# Patient Record
Sex: FEMALE | Race: WHITE | NOT HISPANIC OR LATINO
[De-identification: names, ages, dates, MRNs, and addresses within clinical notes are randomized per-mention and may not be internally consistent; named-entity substitution may affect disease eponyms.]

---

## 2017-01-08 ENCOUNTER — APPOINTMENT (OUTPATIENT)
Dept: HUMAN REPRODUCTION | Facility: CLINIC | Age: 32
End: 2017-01-08

## 2017-01-10 ENCOUNTER — APPOINTMENT (OUTPATIENT)
Dept: HUMAN REPRODUCTION | Facility: CLINIC | Age: 32
End: 2017-01-10

## 2017-01-11 ENCOUNTER — APPOINTMENT (OUTPATIENT)
Dept: HUMAN REPRODUCTION | Facility: CLINIC | Age: 32
End: 2017-01-11

## 2017-01-12 ENCOUNTER — APPOINTMENT (OUTPATIENT)
Dept: HUMAN REPRODUCTION | Facility: CLINIC | Age: 32
End: 2017-01-12

## 2017-01-27 ENCOUNTER — APPOINTMENT (OUTPATIENT)
Dept: HUMAN REPRODUCTION | Facility: CLINIC | Age: 32
End: 2017-01-27

## 2017-02-03 ENCOUNTER — APPOINTMENT (OUTPATIENT)
Dept: HUMAN REPRODUCTION | Facility: CLINIC | Age: 32
End: 2017-02-03

## 2017-02-06 ENCOUNTER — APPOINTMENT (OUTPATIENT)
Dept: HUMAN REPRODUCTION | Facility: CLINIC | Age: 32
End: 2017-02-06

## 2017-02-07 ENCOUNTER — APPOINTMENT (OUTPATIENT)
Dept: HUMAN REPRODUCTION | Facility: CLINIC | Age: 32
End: 2017-02-07

## 2017-02-08 ENCOUNTER — APPOINTMENT (OUTPATIENT)
Dept: HUMAN REPRODUCTION | Facility: CLINIC | Age: 32
End: 2017-02-08

## 2017-02-22 ENCOUNTER — APPOINTMENT (OUTPATIENT)
Dept: HUMAN REPRODUCTION | Facility: CLINIC | Age: 32
End: 2017-02-22

## 2017-02-23 ENCOUNTER — OTHER (OUTPATIENT)
Age: 32
End: 2017-02-23

## 2017-03-01 ENCOUNTER — RESULT REVIEW (OUTPATIENT)
Age: 32
End: 2017-03-01

## 2017-03-02 ENCOUNTER — APPOINTMENT (OUTPATIENT)
Dept: HUMAN REPRODUCTION | Facility: CLINIC | Age: 32
End: 2017-03-02

## 2017-03-07 ENCOUNTER — OTHER (OUTPATIENT)
Age: 32
End: 2017-03-07

## 2017-03-16 ENCOUNTER — RX RENEWAL (OUTPATIENT)
Age: 32
End: 2017-03-16

## 2017-03-16 ENCOUNTER — OTHER (OUTPATIENT)
Age: 32
End: 2017-03-16

## 2017-03-16 ENCOUNTER — APPOINTMENT (OUTPATIENT)
Dept: HUMAN REPRODUCTION | Facility: CLINIC | Age: 32
End: 2017-03-16

## 2017-03-20 ENCOUNTER — APPOINTMENT (OUTPATIENT)
Dept: HUMAN REPRODUCTION | Facility: CLINIC | Age: 32
End: 2017-03-20

## 2017-03-22 ENCOUNTER — APPOINTMENT (OUTPATIENT)
Dept: HUMAN REPRODUCTION | Facility: CLINIC | Age: 32
End: 2017-03-22

## 2017-03-23 ENCOUNTER — APPOINTMENT (OUTPATIENT)
Dept: HUMAN REPRODUCTION | Facility: CLINIC | Age: 32
End: 2017-03-23

## 2017-03-24 ENCOUNTER — APPOINTMENT (OUTPATIENT)
Dept: HUMAN REPRODUCTION | Facility: CLINIC | Age: 32
End: 2017-03-24

## 2017-03-24 ENCOUNTER — OTHER (OUTPATIENT)
Age: 32
End: 2017-03-24

## 2017-03-26 ENCOUNTER — APPOINTMENT (OUTPATIENT)
Dept: HUMAN REPRODUCTION | Facility: CLINIC | Age: 32
End: 2017-03-26

## 2017-03-28 ENCOUNTER — APPOINTMENT (OUTPATIENT)
Dept: HUMAN REPRODUCTION | Facility: CLINIC | Age: 32
End: 2017-03-28

## 2017-03-29 ENCOUNTER — APPOINTMENT (OUTPATIENT)
Dept: HUMAN REPRODUCTION | Facility: CLINIC | Age: 32
End: 2017-03-29

## 2017-03-30 ENCOUNTER — APPOINTMENT (OUTPATIENT)
Dept: HUMAN REPRODUCTION | Facility: CLINIC | Age: 32
End: 2017-03-30

## 2017-04-11 ENCOUNTER — OTHER (OUTPATIENT)
Age: 32
End: 2017-04-11

## 2017-04-12 ENCOUNTER — APPOINTMENT (OUTPATIENT)
Dept: HUMAN REPRODUCTION | Facility: CLINIC | Age: 32
End: 2017-04-12

## 2017-04-13 ENCOUNTER — RX RENEWAL (OUTPATIENT)
Age: 32
End: 2017-04-13

## 2017-04-13 ENCOUNTER — OTHER (OUTPATIENT)
Age: 32
End: 2017-04-13

## 2017-04-14 ENCOUNTER — RX RENEWAL (OUTPATIENT)
Age: 32
End: 2017-04-14

## 2017-04-20 ENCOUNTER — APPOINTMENT (OUTPATIENT)
Dept: HUMAN REPRODUCTION | Facility: CLINIC | Age: 32
End: 2017-04-20

## 2017-04-21 ENCOUNTER — TRANSCRIPTION ENCOUNTER (OUTPATIENT)
Age: 32
End: 2017-04-21

## 2017-04-27 ENCOUNTER — APPOINTMENT (OUTPATIENT)
Dept: HUMAN REPRODUCTION | Facility: CLINIC | Age: 32
End: 2017-04-27

## 2017-05-04 ENCOUNTER — APPOINTMENT (OUTPATIENT)
Dept: HUMAN REPRODUCTION | Facility: CLINIC | Age: 32
End: 2017-05-04

## 2017-05-08 ENCOUNTER — RX RENEWAL (OUTPATIENT)
Age: 32
End: 2017-05-08

## 2017-05-24 ENCOUNTER — APPOINTMENT (OUTPATIENT)
Dept: HUMAN REPRODUCTION | Facility: CLINIC | Age: 32
End: 2017-05-24

## 2017-05-24 ENCOUNTER — OTHER (OUTPATIENT)
Age: 32
End: 2017-05-24

## 2017-05-25 ENCOUNTER — APPOINTMENT (OUTPATIENT)
Dept: HUMAN REPRODUCTION | Facility: CLINIC | Age: 32
End: 2017-05-25

## 2017-05-30 ENCOUNTER — APPOINTMENT (OUTPATIENT)
Dept: HUMAN REPRODUCTION | Facility: CLINIC | Age: 32
End: 2017-05-30

## 2017-06-01 ENCOUNTER — RX RENEWAL (OUTPATIENT)
Age: 32
End: 2017-06-01

## 2017-06-12 ENCOUNTER — APPOINTMENT (OUTPATIENT)
Dept: HUMAN REPRODUCTION | Facility: CLINIC | Age: 32
End: 2017-06-12

## 2017-06-22 ENCOUNTER — APPOINTMENT (OUTPATIENT)
Dept: HUMAN REPRODUCTION | Facility: CLINIC | Age: 32
End: 2017-06-22

## 2018-01-18 ENCOUNTER — INPATIENT (INPATIENT)
Facility: HOSPITAL | Age: 33
LOS: 1 days | Discharge: HOME | End: 2018-01-20
Attending: STUDENT IN AN ORGANIZED HEALTH CARE EDUCATION/TRAINING PROGRAM | Admitting: STUDENT IN AN ORGANIZED HEALTH CARE EDUCATION/TRAINING PROGRAM
Payer: COMMERCIAL

## 2018-01-18 VITALS — WEIGHT: 156.53 LBS | HEIGHT: 66 IN

## 2018-01-18 DIAGNOSIS — O26.899 OTHER SPECIFIED PREGNANCY RELATED CONDITIONS, UNSPECIFIED TRIMESTER: ICD-10-CM

## 2018-01-18 DIAGNOSIS — Z3A.00 WEEKS OF GESTATION OF PREGNANCY NOT SPECIFIED: ICD-10-CM

## 2018-01-18 DIAGNOSIS — Z34.80 ENCOUNTER FOR SUPERVISION OF OTHER NORMAL PREGNANCY, UNSPECIFIED TRIMESTER: ICD-10-CM

## 2018-01-18 LAB
BASOPHILS # BLD AUTO: 0.1 K/UL — SIGNIFICANT CHANGE UP (ref 0–0.2)
BASOPHILS NFR BLD AUTO: 0.5 % — SIGNIFICANT CHANGE UP (ref 0–2)
BLD GP AB SCN SERPL QL: NEGATIVE — SIGNIFICANT CHANGE UP
EOSINOPHIL # BLD AUTO: 0 K/UL — SIGNIFICANT CHANGE UP (ref 0–0.5)
EOSINOPHIL NFR BLD AUTO: 0.3 % — SIGNIFICANT CHANGE UP (ref 0–6)
HCT VFR BLD CALC: 39.1 % — SIGNIFICANT CHANGE UP (ref 34.5–45)
HGB BLD-MCNC: 14.2 G/DL — SIGNIFICANT CHANGE UP (ref 11.5–15.5)
LYMPHOCYTES # BLD AUTO: 1.8 K/UL — SIGNIFICANT CHANGE UP (ref 1–3.3)
LYMPHOCYTES # BLD AUTO: 13.2 % — SIGNIFICANT CHANGE UP (ref 13–44)
MCHC RBC-ENTMCNC: 33.2 PG — SIGNIFICANT CHANGE UP (ref 27–34)
MCHC RBC-ENTMCNC: 36.3 GM/DL — HIGH (ref 32–36)
MCV RBC AUTO: 91.5 FL — SIGNIFICANT CHANGE UP (ref 80–100)
MONOCYTES # BLD AUTO: 1 K/UL — HIGH (ref 0–0.9)
MONOCYTES NFR BLD AUTO: 7.4 % — SIGNIFICANT CHANGE UP (ref 2–14)
NEUTROPHILS # BLD AUTO: 10.8 K/UL — HIGH (ref 1.8–7.4)
NEUTROPHILS NFR BLD AUTO: 78.6 % — HIGH (ref 43–77)
PLATELET # BLD AUTO: 220 K/UL — SIGNIFICANT CHANGE UP (ref 150–400)
RBC # BLD: 4.27 M/UL — SIGNIFICANT CHANGE UP (ref 3.8–5.2)
RBC # FLD: 12.1 % — SIGNIFICANT CHANGE UP (ref 10.3–14.5)
RH IG SCN BLD-IMP: NEGATIVE — SIGNIFICANT CHANGE UP
RH IG SCN BLD-IMP: NEGATIVE — SIGNIFICANT CHANGE UP
T PALLIDUM AB TITR SER: NEGATIVE — SIGNIFICANT CHANGE UP
WBC # BLD: 13.7 K/UL — HIGH (ref 3.8–10.5)
WBC # FLD AUTO: 13.7 K/UL — HIGH (ref 3.8–10.5)

## 2018-01-18 RX ORDER — ACETAMINOPHEN 500 MG
975 TABLET ORAL EVERY 6 HOURS
Qty: 0 | Refills: 0 | Status: COMPLETED | OUTPATIENT
Start: 2018-01-18 | End: 2018-12-17

## 2018-01-18 RX ORDER — OXYCODONE HYDROCHLORIDE 5 MG/1
5 TABLET ORAL EVERY 4 HOURS
Qty: 0 | Refills: 0 | Status: DISCONTINUED | OUTPATIENT
Start: 2018-01-18 | End: 2018-01-20

## 2018-01-18 RX ORDER — OXYTOCIN 10 UNIT/ML
4 VIAL (ML) INJECTION
Qty: 30 | Refills: 0 | Status: DISCONTINUED | OUTPATIENT
Start: 2018-01-18 | End: 2018-01-20

## 2018-01-18 RX ORDER — AER TRAVELER 0.5 G/1
1 SOLUTION RECTAL; TOPICAL EVERY 4 HOURS
Qty: 0 | Refills: 0 | Status: DISCONTINUED | OUTPATIENT
Start: 2018-01-18 | End: 2018-01-19

## 2018-01-18 RX ORDER — PRAMOXINE HYDROCHLORIDE 150 MG/15G
1 AEROSOL, FOAM RECTAL EVERY 4 HOURS
Qty: 0 | Refills: 0 | Status: DISCONTINUED | OUTPATIENT
Start: 2018-01-18 | End: 2018-01-19

## 2018-01-18 RX ORDER — SODIUM CHLORIDE 9 MG/ML
1000 INJECTION, SOLUTION INTRAVENOUS
Qty: 0 | Refills: 0 | Status: DISCONTINUED | OUTPATIENT
Start: 2018-01-18 | End: 2018-01-19

## 2018-01-18 RX ORDER — KETOROLAC TROMETHAMINE 30 MG/ML
30 SYRINGE (ML) INJECTION ONCE
Qty: 0 | Refills: 0 | Status: DISCONTINUED | OUTPATIENT
Start: 2018-01-18 | End: 2018-01-20

## 2018-01-18 RX ORDER — OXYCODONE HYDROCHLORIDE 5 MG/1
5 TABLET ORAL
Qty: 0 | Refills: 0 | Status: DISCONTINUED | OUTPATIENT
Start: 2018-01-18 | End: 2018-01-20

## 2018-01-18 RX ORDER — OXYTOCIN 10 UNIT/ML
41.67 VIAL (ML) INJECTION
Qty: 20 | Refills: 0 | Status: DISCONTINUED | OUTPATIENT
Start: 2018-01-18 | End: 2018-01-19

## 2018-01-18 RX ORDER — SODIUM CHLORIDE 9 MG/ML
500 INJECTION, SOLUTION INTRAVENOUS ONCE
Qty: 0 | Refills: 0 | Status: COMPLETED | OUTPATIENT
Start: 2018-01-18 | End: 2018-01-18

## 2018-01-18 RX ORDER — CITRIC ACID/SODIUM CITRATE 300-500 MG
15 SOLUTION, ORAL ORAL EVERY 4 HOURS
Qty: 0 | Refills: 0 | Status: DISCONTINUED | OUTPATIENT
Start: 2018-01-18 | End: 2018-01-19

## 2018-01-18 RX ORDER — DIBUCAINE 1 %
1 OINTMENT (GRAM) RECTAL EVERY 4 HOURS
Qty: 0 | Refills: 0 | Status: DISCONTINUED | OUTPATIENT
Start: 2018-01-18 | End: 2018-01-19

## 2018-01-18 RX ORDER — IBUPROFEN 200 MG
600 TABLET ORAL EVERY 6 HOURS
Qty: 0 | Refills: 0 | Status: DISCONTINUED | OUTPATIENT
Start: 2018-01-18 | End: 2018-01-20

## 2018-01-18 RX ORDER — SODIUM CHLORIDE 9 MG/ML
3 INJECTION INTRAMUSCULAR; INTRAVENOUS; SUBCUTANEOUS EVERY 8 HOURS
Qty: 0 | Refills: 0 | Status: DISCONTINUED | OUTPATIENT
Start: 2018-01-18 | End: 2018-01-19

## 2018-01-18 RX ORDER — HYDROCORTISONE 1 %
1 OINTMENT (GRAM) TOPICAL EVERY 4 HOURS
Qty: 0 | Refills: 0 | Status: DISCONTINUED | OUTPATIENT
Start: 2018-01-18 | End: 2018-01-19

## 2018-01-18 RX ORDER — OXYTOCIN 10 UNIT/ML
333.33 VIAL (ML) INJECTION
Qty: 20 | Refills: 0 | Status: DISCONTINUED | OUTPATIENT
Start: 2018-01-18 | End: 2018-01-19

## 2018-01-18 RX ADMIN — Medication 4 MILLIUNIT(S)/MIN: at 16:30

## 2018-01-18 RX ADMIN — SODIUM CHLORIDE 1000 MILLILITER(S): 9 INJECTION, SOLUTION INTRAVENOUS at 14:56

## 2018-01-19 LAB
APTT BLD: 24.6 SEC — LOW (ref 27.5–37.4)
BASOPHILS # BLD AUTO: 0.1 K/UL — SIGNIFICANT CHANGE UP (ref 0–0.2)
BASOPHILS NFR BLD AUTO: 1 % — SIGNIFICANT CHANGE UP (ref 0–2)
FIBRINOGEN PPP-MCNC: 586 MG/DL — HIGH (ref 310–510)
HCT VFR BLD CALC: 28 % — LOW (ref 34.5–45)
HCT VFR BLD CALC: 33.1 % — LOW (ref 34.5–45)
HCT VFR BLD CALC: 36.3 % — SIGNIFICANT CHANGE UP (ref 34.5–45)
HGB BLD-MCNC: 10.6 G/DL — LOW (ref 11.5–15.5)
HGB BLD-MCNC: 11.7 G/DL — SIGNIFICANT CHANGE UP (ref 11.5–15.5)
HGB BLD-MCNC: 12.4 G/DL — SIGNIFICANT CHANGE UP (ref 11.5–15.5)
INR BLD: 0.97 RATIO — SIGNIFICANT CHANGE UP (ref 0.88–1.16)
LACTATE SERPL-SCNC: 1.6 MMOL/L — SIGNIFICANT CHANGE UP (ref 0.7–2)
LACTATE SERPL-SCNC: 2.6 MMOL/L — HIGH (ref 0.7–2)
LACTATE SERPL-SCNC: 5.4 MMOL/L — CRITICAL HIGH (ref 0.7–2)
LYMPHOCYTES # BLD AUTO: 1.6 K/UL — SIGNIFICANT CHANGE UP (ref 1–3.3)
LYMPHOCYTES # BLD AUTO: 7 % — LOW (ref 13–44)
MCHC RBC-ENTMCNC: 31.9 PG — SIGNIFICANT CHANGE UP (ref 27–34)
MCHC RBC-ENTMCNC: 32.3 PG — SIGNIFICANT CHANGE UP (ref 27–34)
MCHC RBC-ENTMCNC: 34.3 GM/DL — SIGNIFICANT CHANGE UP (ref 32–36)
MCHC RBC-ENTMCNC: 34.7 PG — HIGH (ref 27–34)
MCHC RBC-ENTMCNC: 35.4 GM/DL — SIGNIFICANT CHANGE UP (ref 32–36)
MCHC RBC-ENTMCNC: 37.7 GM/DL — HIGH (ref 32–36)
MCV RBC AUTO: 91.3 FL — SIGNIFICANT CHANGE UP (ref 80–100)
MCV RBC AUTO: 91.9 FL — SIGNIFICANT CHANGE UP (ref 80–100)
MCV RBC AUTO: 93.1 FL — SIGNIFICANT CHANGE UP (ref 80–100)
MONOCYTES # BLD AUTO: 1.5 K/UL — HIGH (ref 0–0.9)
MONOCYTES NFR BLD AUTO: 8 % — SIGNIFICANT CHANGE UP (ref 2–14)
NEUTROPHILS # BLD AUTO: 24.2 K/UL — HIGH (ref 1.8–7.4)
NEUTROPHILS NFR BLD AUTO: 75 % — SIGNIFICANT CHANGE UP (ref 43–77)
PLATELET # BLD AUTO: 150 K/UL — SIGNIFICANT CHANGE UP (ref 150–400)
PLATELET # BLD AUTO: 174 K/UL — SIGNIFICANT CHANGE UP (ref 150–400)
PLATELET # BLD AUTO: 253 K/UL — SIGNIFICANT CHANGE UP (ref 150–400)
PROTHROM AB SERPL-ACNC: 10.6 SEC — SIGNIFICANT CHANGE UP (ref 9.8–12.7)
RBC # BLD: 3.05 M/UL — LOW (ref 3.8–5.2)
RBC # BLD: 3.63 M/UL — LOW (ref 3.8–5.2)
RBC # BLD: 3.9 M/UL — SIGNIFICANT CHANGE UP (ref 3.8–5.2)
RBC # FLD: 12.2 % — SIGNIFICANT CHANGE UP (ref 10.3–14.5)
RBC # FLD: 12.4 % — SIGNIFICANT CHANGE UP (ref 10.3–14.5)
RBC # FLD: 12.5 % — SIGNIFICANT CHANGE UP (ref 10.3–14.5)
WBC # BLD: 26 K/UL — HIGH (ref 3.8–10.5)
WBC # BLD: 27.4 K/UL — HIGH (ref 3.8–10.5)
WBC # BLD: 31.1 K/UL — HIGH (ref 3.8–10.5)
WBC # FLD AUTO: 26 K/UL — HIGH (ref 3.8–10.5)
WBC # FLD AUTO: 27.4 K/UL — HIGH (ref 3.8–10.5)
WBC # FLD AUTO: 31.1 K/UL — HIGH (ref 3.8–10.5)

## 2018-01-19 RX ORDER — ACETAMINOPHEN 500 MG
1000 TABLET ORAL ONCE
Qty: 0 | Refills: 0 | Status: COMPLETED | OUTPATIENT
Start: 2018-01-19 | End: 2018-01-19

## 2018-01-19 RX ORDER — SIMETHICONE 80 MG/1
80 TABLET, CHEWABLE ORAL EVERY 6 HOURS
Qty: 0 | Refills: 0 | Status: DISCONTINUED | OUTPATIENT
Start: 2018-01-19 | End: 2018-01-20

## 2018-01-19 RX ORDER — ACETAMINOPHEN 500 MG
975 TABLET ORAL EVERY 6 HOURS
Qty: 0 | Refills: 0 | Status: DISCONTINUED | OUTPATIENT
Start: 2018-01-19 | End: 2018-01-20

## 2018-01-19 RX ORDER — DOCUSATE SODIUM 100 MG
100 CAPSULE ORAL
Qty: 0 | Refills: 0 | Status: DISCONTINUED | OUTPATIENT
Start: 2018-01-19 | End: 2018-01-20

## 2018-01-19 RX ORDER — TETANUS TOXOID, REDUCED DIPHTHERIA TOXOID AND ACELLULAR PERTUSSIS VACCINE, ADSORBED 5; 2.5; 8; 8; 2.5 [IU]/.5ML; [IU]/.5ML; UG/.5ML; UG/.5ML; UG/.5ML
0.5 SUSPENSION INTRAMUSCULAR ONCE
Qty: 0 | Refills: 0 | Status: DISCONTINUED | OUTPATIENT
Start: 2018-01-19 | End: 2018-01-20

## 2018-01-19 RX ORDER — CARBOPROST TROMETHAMINE 250 UG/ML
250 INJECTION, SOLUTION INTRAMUSCULAR ONCE
Qty: 0 | Refills: 0 | Status: COMPLETED | OUTPATIENT
Start: 2018-01-19 | End: 2018-01-19

## 2018-01-19 RX ORDER — DIPHENHYDRAMINE HCL 50 MG
25 CAPSULE ORAL EVERY 6 HOURS
Qty: 0 | Refills: 0 | Status: DISCONTINUED | OUTPATIENT
Start: 2018-01-19 | End: 2018-01-20

## 2018-01-19 RX ORDER — SODIUM CHLORIDE 9 MG/ML
3 INJECTION INTRAMUSCULAR; INTRAVENOUS; SUBCUTANEOUS EVERY 8 HOURS
Qty: 0 | Refills: 0 | Status: DISCONTINUED | OUTPATIENT
Start: 2018-01-19 | End: 2018-01-20

## 2018-01-19 RX ORDER — PRAMOXINE HYDROCHLORIDE 150 MG/15G
1 AEROSOL, FOAM RECTAL EVERY 4 HOURS
Qty: 0 | Refills: 0 | Status: DISCONTINUED | OUTPATIENT
Start: 2018-01-19 | End: 2018-01-20

## 2018-01-19 RX ORDER — AER TRAVELER 0.5 G/1
1 SOLUTION RECTAL; TOPICAL EVERY 4 HOURS
Qty: 0 | Refills: 0 | Status: DISCONTINUED | OUTPATIENT
Start: 2018-01-19 | End: 2018-01-20

## 2018-01-19 RX ORDER — HYDROMORPHONE HYDROCHLORIDE 2 MG/ML
2 INJECTION INTRAMUSCULAR; INTRAVENOUS; SUBCUTANEOUS ONCE
Qty: 0 | Refills: 0 | Status: DISCONTINUED | OUTPATIENT
Start: 2018-01-19 | End: 2018-01-20

## 2018-01-19 RX ORDER — DIPHENOXYLATE HCL/ATROPINE 2.5-.025MG
1 TABLET ORAL ONCE
Qty: 0 | Refills: 0 | Status: DISCONTINUED | OUTPATIENT
Start: 2018-01-19 | End: 2018-01-19

## 2018-01-19 RX ORDER — CEFAZOLIN SODIUM 1 G
2000 VIAL (EA) INJECTION EVERY 8 HOURS
Qty: 0 | Refills: 0 | Status: COMPLETED | OUTPATIENT
Start: 2018-01-19 | End: 2018-01-19

## 2018-01-19 RX ORDER — OXYTOCIN 10 UNIT/ML
41.67 VIAL (ML) INJECTION
Qty: 20 | Refills: 0 | Status: DISCONTINUED | OUTPATIENT
Start: 2018-01-19 | End: 2018-01-20

## 2018-01-19 RX ORDER — GLYCERIN ADULT
1 SUPPOSITORY, RECTAL RECTAL AT BEDTIME
Qty: 0 | Refills: 0 | Status: DISCONTINUED | OUTPATIENT
Start: 2018-01-19 | End: 2018-01-20

## 2018-01-19 RX ORDER — LANOLIN
1 OINTMENT (GRAM) TOPICAL EVERY 6 HOURS
Qty: 0 | Refills: 0 | Status: DISCONTINUED | OUTPATIENT
Start: 2018-01-19 | End: 2018-01-20

## 2018-01-19 RX ORDER — MAGNESIUM HYDROXIDE 400 MG/1
30 TABLET, CHEWABLE ORAL
Qty: 0 | Refills: 0 | Status: DISCONTINUED | OUTPATIENT
Start: 2018-01-19 | End: 2018-01-20

## 2018-01-19 RX ORDER — DIBUCAINE 1 %
1 OINTMENT (GRAM) RECTAL EVERY 4 HOURS
Qty: 0 | Refills: 0 | Status: DISCONTINUED | OUTPATIENT
Start: 2018-01-19 | End: 2018-01-20

## 2018-01-19 RX ORDER — HYDROCORTISONE 1 %
1 OINTMENT (GRAM) TOPICAL EVERY 4 HOURS
Qty: 0 | Refills: 0 | Status: DISCONTINUED | OUTPATIENT
Start: 2018-01-19 | End: 2018-01-20

## 2018-01-19 RX ADMIN — OXYCODONE HYDROCHLORIDE 5 MILLIGRAM(S): 5 TABLET ORAL at 06:14

## 2018-01-19 RX ADMIN — Medication 0.2 MILLIGRAM(S): at 23:53

## 2018-01-19 RX ADMIN — Medication 0.2 MILLIGRAM(S): at 19:57

## 2018-01-19 RX ADMIN — Medication 100 MILLIGRAM(S): at 18:11

## 2018-01-19 RX ADMIN — Medication 1 TABLET(S): at 16:06

## 2018-01-19 RX ADMIN — Medication 400 MILLIGRAM(S): at 16:07

## 2018-01-19 RX ADMIN — SODIUM CHLORIDE 3 MILLILITER(S): 9 INJECTION INTRAMUSCULAR; INTRAVENOUS; SUBCUTANEOUS at 15:54

## 2018-01-19 RX ADMIN — Medication 100 MILLIGRAM(S): at 10:51

## 2018-01-19 RX ADMIN — OXYCODONE HYDROCHLORIDE 5 MILLIGRAM(S): 5 TABLET ORAL at 19:20

## 2018-01-19 RX ADMIN — Medication 100 MILLIGRAM(S): at 02:29

## 2018-01-19 RX ADMIN — Medication 0.2 MILLIGRAM(S): at 00:04

## 2018-01-19 RX ADMIN — OXYCODONE HYDROCHLORIDE 5 MILLIGRAM(S): 5 TABLET ORAL at 21:00

## 2018-01-19 RX ADMIN — Medication 400 MILLIGRAM(S): at 09:50

## 2018-01-19 RX ADMIN — Medication 975 MILLIGRAM(S): at 03:15

## 2018-01-19 RX ADMIN — OXYCODONE HYDROCHLORIDE 5 MILLIGRAM(S): 5 TABLET ORAL at 18:47

## 2018-01-19 RX ADMIN — CARBOPROST TROMETHAMINE 250 MICROGRAM(S): 250 INJECTION, SOLUTION INTRAMUSCULAR at 00:05

## 2018-01-19 RX ADMIN — Medication 1 TABLET(S): at 00:07

## 2018-01-19 RX ADMIN — CARBOPROST TROMETHAMINE 250 MICROGRAM(S): 250 INJECTION, SOLUTION INTRAMUSCULAR at 00:21

## 2018-01-19 RX ADMIN — OXYCODONE HYDROCHLORIDE 5 MILLIGRAM(S): 5 TABLET ORAL at 09:26

## 2018-01-19 RX ADMIN — OXYCODONE HYDROCHLORIDE 5 MILLIGRAM(S): 5 TABLET ORAL at 13:00

## 2018-01-19 RX ADMIN — OXYCODONE HYDROCHLORIDE 5 MILLIGRAM(S): 5 TABLET ORAL at 14:15

## 2018-01-19 RX ADMIN — Medication 0.2 MILLIGRAM(S): at 16:06

## 2018-01-19 RX ADMIN — Medication 0.2 MILLIGRAM(S): at 04:30

## 2018-01-19 RX ADMIN — Medication 0.2 MILLIGRAM(S): at 08:30

## 2018-01-19 RX ADMIN — Medication 0.2 MILLIGRAM(S): at 12:30

## 2018-01-19 RX ADMIN — Medication 975 MILLIGRAM(S): at 23:53

## 2018-01-19 RX ADMIN — OXYCODONE HYDROCHLORIDE 5 MILLIGRAM(S): 5 TABLET ORAL at 21:30

## 2018-01-19 RX ADMIN — OXYCODONE HYDROCHLORIDE 5 MILLIGRAM(S): 5 TABLET ORAL at 23:54

## 2018-01-19 NOTE — PROGRESS NOTE ADULT - ASSESSMENT
A/P: 31 yo  s/p FAVD for arrest c/b PPH, EBL  2000, s/p Ganga balloon placement, PPD#0    Pt currently stable.  Neuro: Pain well controlled  CV: Mild hypotension and tachycardia likely 2/2 to PPH. Bleeding controlled s/p uterotonics and Ganga balloon placement. Minimal bleeding at this time and Ganga output is also minimal. Continue close monitoring in PACU.  Will repeat CBC two hours after transfusion.   Pulm: O2 sat wnl on RA  GI: Reg diet as tolerated  : UOP adequate via doty  Heme: Ambulation for DVT ppx  ID: Afebrile  Dispo: Routine care  JOSEP Chau, PGY3 A/P: 31 yo  s/p FAVD for arrest c/b PPH, EBL  2000, s/p Ganga balloon placement, PPD#0    Pt currently stable.  Neuro: Pain well controlled  CV: Mild hypotension and tachycardia 2/2 to PPH. Bleeding controlled s/p uterotonics and Ganga balloon placement. Minimal bleeding at this time and Ganga output is also minimal. Continue close monitoring in PACU.  Will repeat CBC two hours after transfusion.   Pulm: O2 sat wnl on RA  GI: Reg diet as tolerated  : UOP adequate via doty  Heme: Ambulation for DVT ppx  ID: Afebrile  Dispo: Routine care  JOSEP Chau, PGY3

## 2018-01-19 NOTE — PROGRESS NOTE ADULT - SUBJECTIVE AND OBJECTIVE BOX
R3 Postpartum Note:  Pt examined at the bedside. No acute events overnight.  Pain reports vaginal soreness and abdominal pain. No other complaints at this time. Pt not yet ambulating, or voiding. Pt tolerating PO.  Denies chest pain, SOB, abdominal pain, n/v, fever or chills.      MEDICATIONS  (STANDING):  acetaminophen   Tablet. 975 milliGRAM(s) Oral every 6 hours  carboprost Injectable 250 MICROGram(s) IntraMuscular once  ceFAZolin   IVPB 2000 milliGRAM(s) IV Intermittent every 8 hours  diphenoxylate/atropine 1 Tablet(s) Oral once  diphtheria/tetanus/pertussis (acellular) Vaccine (ADAcel) 0.5 milliLiter(s) IntraMuscular once  ibuprofen  Tablet 600 milliGRAM(s) Oral every 6 hours  ketorolac   Injectable 30 milliGRAM(s) IV Push once  methylergonovine 0.2 milliGRAM(s) Oral every 4 hours  oxyCODONE    IR 5 milliGRAM(s) Oral every 3 hours  oxytocin Infusion 4 milliUNIT(s)/Min (4 mL/Hr) IV Continuous <Continuous>  oxytocin Infusion 41.667 milliUNIT(s)/Min (125 mL/Hr) IV Continuous <Continuous>  prenatal multivitamin 1 Tablet(s) Oral daily  sodium chloride 0.9% lock flush 3 milliLiter(s) IV Push every 8 hours    MEDICATIONS  (PRN):  dibucaine 1% Ointment 1 Application(s) Topical every 4 hours PRN Perineal Discomfort  diphenhydrAMINE   Capsule 25 milliGRAM(s) Oral every 6 hours PRN Itching  docusate sodium 100 milliGRAM(s) Oral two times a day PRN Stool Softening  glycerin Suppository - Adult 1 Suppository(s) Rectal at bedtime PRN Constipation  hydrocortisone 1% Cream 1 Application(s) Topical every 4 hours PRN Moderate to Severe Perineal Pain  lanolin Ointment 1 Application(s) Topical every 6 hours PRN Sore Nipples  magnesium hydroxide Suspension 30 milliLiter(s) Oral two times a day PRN Constipation  oxyCODONE    IR 5 milliGRAM(s) Oral every 4 hours PRN Severe Pain (7 -10)  pramoxine 1%/zinc 5% Cream 1 Application(s) Topical every 4 hours PRN Moderate to Severe Perineal Pain  simethicone 80 milliGRAM(s) Chew every 6 hours PRN Gas  witch hazel Pads 1 Application(s) Topical every 4 hours PRN Perineal Discomfort    Vital Signs Last 24 Hrs  T(C): 36.9 (19 Jan 2018 05:00), Max: 37.9 (19 Jan 2018 00:54)  T(F): 98.4 (19 Jan 2018 05:00), Max: 100.2 (19 Jan 2018 00:54)  HR: 104 (19 Jan 2018 05:00) (104 - 141)  BP: 101/59 (19 Jan 2018 05:00) (101/59 - 120/74)  BP(mean): 76 (19 Jan 2018 05:00) (76 - 76)  RR: 18 (19 Jan 2018 05:00) (18 - 18)  SpO2: 97% (19 Jan 2018 05:00) (97% - 100%)      PHYSICAL EXAM:    GA: NAD, A+0 x 3  CV: RRR  Pulm: CTA BL  Abd: soft, nontender, nondistended, no rebound or guarding,   Uterus: Fundus midline; firm at two  fb below umbilicus  : lochia WNL;  x1  Bakri Balloon: 30 cc  Extremities: no swelling or calf tenderness          LABS:                        12.4   26.0  )-----------( 253      ( 19 Jan 2018 00:33 )             36.3           PT/INR - ( 19 Jan 2018 00:34 )   PT: 10.6 sec;   INR: 0.97 ratio         PTT - ( 19 Jan 2018 00:34 )  PTT:24.6 sec      RADIOLOGY & ADDITIONAL TESTS: R3 Postpartum Note:  Pt examined at the bedside. No acute events overnight.  Pain reports vaginal soreness and abdominal pain. No other complaints at this time. Pt not yet ambulating, or voiding. Pt tolerating PO.  Denies chest pain, SOB, abdominal pain, n/v, fever or chills.      MEDICATIONS  (STANDING):  acetaminophen   Tablet. 975 milliGRAM(s) Oral every 6 hours  carboprost Injectable 250 MICROGram(s) IntraMuscular once  ceFAZolin   IVPB 2000 milliGRAM(s) IV Intermittent every 8 hours  diphenoxylate/atropine 1 Tablet(s) Oral once  diphtheria/tetanus/pertussis (acellular) Vaccine (ADAcel) 0.5 milliLiter(s) IntraMuscular once  ibuprofen  Tablet 600 milliGRAM(s) Oral every 6 hours  ketorolac   Injectable 30 milliGRAM(s) IV Push once  methylergonovine 0.2 milliGRAM(s) Oral every 4 hours  oxyCODONE    IR 5 milliGRAM(s) Oral every 3 hours  oxytocin Infusion 4 milliUNIT(s)/Min (4 mL/Hr) IV Continuous <Continuous>  oxytocin Infusion 41.667 milliUNIT(s)/Min (125 mL/Hr) IV Continuous <Continuous>  prenatal multivitamin 1 Tablet(s) Oral daily  sodium chloride 0.9% lock flush 3 milliLiter(s) IV Push every 8 hours    MEDICATIONS  (PRN):  dibucaine 1% Ointment 1 Application(s) Topical every 4 hours PRN Perineal Discomfort  diphenhydrAMINE   Capsule 25 milliGRAM(s) Oral every 6 hours PRN Itching  docusate sodium 100 milliGRAM(s) Oral two times a day PRN Stool Softening  glycerin Suppository - Adult 1 Suppository(s) Rectal at bedtime PRN Constipation  hydrocortisone 1% Cream 1 Application(s) Topical every 4 hours PRN Moderate to Severe Perineal Pain  lanolin Ointment 1 Application(s) Topical every 6 hours PRN Sore Nipples  magnesium hydroxide Suspension 30 milliLiter(s) Oral two times a day PRN Constipation  oxyCODONE    IR 5 milliGRAM(s) Oral every 4 hours PRN Severe Pain (7 -10)  pramoxine 1%/zinc 5% Cream 1 Application(s) Topical every 4 hours PRN Moderate to Severe Perineal Pain  simethicone 80 milliGRAM(s) Chew every 6 hours PRN Gas  witch hazel Pads 1 Application(s) Topical every 4 hours PRN Perineal Discomfort    Vital Signs Last 24 Hrs  T(C): 36.9 (19 Jan 2018 05:00), Max: 37.9 (19 Jan 2018 00:54)  T(F): 98.4 (19 Jan 2018 05:00), Max: 100.2 (19 Jan 2018 00:54)  HR: 104 (19 Jan 2018 05:00) (104 - 141)  BP: 101/59 (19 Jan 2018 05:00) (101/59 - 120/74)  BP(mean): 76 (19 Jan 2018 05:00) (76 - 76)  RR: 18 (19 Jan 2018 05:00) (18 - 18)  SpO2: 97% (19 Jan 2018 05:00) (97% - 100%)      PHYSICAL EXAM:    GA: NAD, A+0 x 3  CV: RRR  Pulm: CTA BL  Abd: soft, nontender, nondistended, no rebound or guarding,   Uterus: Fundus midline; firm at two  fb below umbilicus  : lochia WNL;  x1  Bakri Balloon: 15 cc  Extremities: no swelling or calf tenderness          LABS:                        12.4   26.0  )-----------( 253      ( 19 Jan 2018 00:33 )             36.3           PT/INR - ( 19 Jan 2018 00:34 )   PT: 10.6 sec;   INR: 0.97 ratio         PTT - ( 19 Jan 2018 00:34 )  PTT:24.6 sec      RADIOLOGY & ADDITIONAL TESTS:

## 2018-01-20 ENCOUNTER — TRANSCRIPTION ENCOUNTER (OUTPATIENT)
Age: 33
End: 2018-01-20

## 2018-01-20 VITALS
TEMPERATURE: 98 F | HEART RATE: 98 BPM | SYSTOLIC BLOOD PRESSURE: 90 MMHG | DIASTOLIC BLOOD PRESSURE: 57 MMHG | RESPIRATION RATE: 18 BRPM

## 2018-01-20 LAB — KLEIHAUER-BETKE CALCULATION: 0.1 % — SIGNIFICANT CHANGE UP (ref 0–0.3)

## 2018-01-20 PROCEDURE — 86780 TREPONEMA PALLIDUM: CPT

## 2018-01-20 PROCEDURE — 85610 PROTHROMBIN TIME: CPT

## 2018-01-20 PROCEDURE — 85460 HEMOGLOBIN FETAL: CPT

## 2018-01-20 PROCEDURE — 83605 ASSAY OF LACTIC ACID: CPT

## 2018-01-20 PROCEDURE — 59050 FETAL MONITOR W/REPORT: CPT

## 2018-01-20 PROCEDURE — 36430 TRANSFUSION BLD/BLD COMPNT: CPT

## 2018-01-20 PROCEDURE — 86901 BLOOD TYPING SEROLOGIC RH(D): CPT

## 2018-01-20 PROCEDURE — 86923 COMPATIBILITY TEST ELECTRIC: CPT

## 2018-01-20 PROCEDURE — 85384 FIBRINOGEN ACTIVITY: CPT

## 2018-01-20 PROCEDURE — 85730 THROMBOPLASTIN TIME PARTIAL: CPT

## 2018-01-20 PROCEDURE — 86900 BLOOD TYPING SEROLOGIC ABO: CPT

## 2018-01-20 PROCEDURE — 59025 FETAL NON-STRESS TEST: CPT

## 2018-01-20 PROCEDURE — 85027 COMPLETE CBC AUTOMATED: CPT

## 2018-01-20 PROCEDURE — P9016: CPT

## 2018-01-20 PROCEDURE — 86850 RBC ANTIBODY SCREEN: CPT

## 2018-01-20 PROCEDURE — G0463: CPT

## 2018-01-20 RX ORDER — IBUPROFEN 200 MG
600 TABLET ORAL EVERY 6 HOURS
Qty: 0 | Refills: 0 | Status: DISCONTINUED | OUTPATIENT
Start: 2018-01-20 | End: 2018-01-20

## 2018-01-20 RX ORDER — OXYCODONE HYDROCHLORIDE 5 MG/1
1 TABLET ORAL
Qty: 5 | Refills: 0 | OUTPATIENT
Start: 2018-01-20

## 2018-01-20 RX ADMIN — SODIUM CHLORIDE 3 MILLILITER(S): 9 INJECTION INTRAMUSCULAR; INTRAVENOUS; SUBCUTANEOUS at 05:39

## 2018-01-20 RX ADMIN — Medication 600 MILLIGRAM(S): at 14:16

## 2018-01-20 RX ADMIN — Medication 975 MILLIGRAM(S): at 12:00

## 2018-01-20 RX ADMIN — Medication 975 MILLIGRAM(S): at 11:01

## 2018-01-20 RX ADMIN — OXYCODONE HYDROCHLORIDE 5 MILLIGRAM(S): 5 TABLET ORAL at 14:15

## 2018-01-20 RX ADMIN — Medication 100 MILLIGRAM(S): at 08:08

## 2018-01-20 RX ADMIN — OXYCODONE HYDROCHLORIDE 5 MILLIGRAM(S): 5 TABLET ORAL at 05:40

## 2018-01-20 RX ADMIN — SODIUM CHLORIDE 3 MILLILITER(S): 9 INJECTION INTRAMUSCULAR; INTRAVENOUS; SUBCUTANEOUS at 12:05

## 2018-01-20 RX ADMIN — OXYCODONE HYDROCHLORIDE 5 MILLIGRAM(S): 5 TABLET ORAL at 15:30

## 2018-01-20 RX ADMIN — Medication 1 TABLET(S): at 12:03

## 2018-01-20 RX ADMIN — OXYCODONE HYDROCHLORIDE 5 MILLIGRAM(S): 5 TABLET ORAL at 00:24

## 2018-01-20 RX ADMIN — Medication 975 MILLIGRAM(S): at 00:20

## 2018-01-20 RX ADMIN — Medication 975 MILLIGRAM(S): at 05:40

## 2018-01-20 RX ADMIN — Medication 600 MILLIGRAM(S): at 15:30

## 2018-01-20 RX ADMIN — SODIUM CHLORIDE 3 MILLILITER(S): 9 INJECTION INTRAMUSCULAR; INTRAVENOUS; SUBCUTANEOUS at 05:38

## 2018-01-20 NOTE — DISCHARGE NOTE OB - PATIENT PORTAL LINK FT
“You can access the [facility] Patient Portal, offered by [facility], by registering with the following website: [email or web address]”

## 2018-01-20 NOTE — DISCHARGE NOTE OB - CARE PLAN
Principal Discharge DX:	Vaginal delivery  Goal:	post-partum care  Assessment and plan of treatment:	as directed

## 2018-01-20 NOTE — DISCHARGE NOTE OB - HOSPITAL COURSE
Term pregnancy admitted in labor. Low forcep delivery and post-partum course complicated by immediate PP hemorrhage. She received various meds and 2u PRBC's. Recovery stable.

## 2018-01-20 NOTE — DISCHARGE NOTE OB - MEDICATION SUMMARY - MEDICATIONS TO TAKE
I will START or STAY ON the medications listed below when I get home from the hospital:  None I will START or STAY ON the medications listed below when I get home from the hospital:    oxyCODONE 5 mg oral capsule  -- 1 cap(s) by mouth every 6 hours, As Needed -for severe pain MDD:6   -- Caution federal law prohibits the transfer of this drug to any person other  than the person for whom it was prescribed.  It is very important that you take or use this exactly as directed.  Do not skip doses or discontinue unless directed by your doctor.  May cause drowsiness.  Alcohol may intensify this effect.  Use care when operating dangerous machinery.  This prescription cannot be refilled.  Using more of this medication than prescribed may cause serious breathing problems.    -- Indication: For pain

## 2018-01-20 NOTE — PROGRESS NOTE ADULT - ASSESSMENT
A/P: 31 y/o P1 PPD #1 s/p FAVD for arrest of descent c/b PPH 2000 s/p Bakri, vaginal packing  1. Neuro: pain control with tylenol, motrin and oxycodone, ice packs to perineum   2. CV: HD stable, f/u AM CBC  3. Resp: no active issues  4. Zoila continue reg diet  5. : s/p doty, s/p void  6. ID: s/p Ancef x24 hours for prophylaxis  7. Heme: f/u AM CBC. PPH 2L, s/p 2uprbc (1/19), IM methergine, methergine series (1/19-1/20), Hemabate and cytotec SD  8. Dispo: Continue current inpatient care        N Sample, PGY3

## 2018-01-20 NOTE — DISCHARGE NOTE OB - CARE PROVIDER_API CALL
Ed Dobbins (MD), Obstetrics and Gynecology  [address] [place] [address] [address]  [place] [address]  Phone: [phone]  Fax: [phone]

## 2018-01-20 NOTE — PROGRESS NOTE ADULT - SUBJECTIVE AND OBJECTIVE BOX
R3 OB Note PPD#1     Patient seen and examined at bedside. No acute events overnight. Pain well controlled with PO meds. Denies chest pain and SOB. denies dizziness and palpitations. +OOB and ambulating. Tolerating PO, no N/V.     Vital Signs Last 24 Hrs  T(C): 36.6 (20 Jan 2018 01:00), Max: 37.1 (19 Jan 2018 06:48)  T(F): 97.9 (20 Jan 2018 01:00), Max: 98.8 (19 Jan 2018 06:48)  HR: 86 (20 Jan 2018 01:00) (86 - 109)  BP: 119/76 (20 Jan 2018 01:00) (100/57 - 126/64)  BP(mean): 86 (19 Jan 2018 11:30) (72 - 86)  RR: 18 (20 Jan 2018 01:00) (16 - 18)  SpO2: 98% (20 Jan 2018 01:00) (95% - 100%)    Gen: NAD  Abd: soft, non tender, non distended  : swollen labia, normal lochia   Ext: NTTP, no edema     AM CBC pending    LABS:                        10.6   27.4  )-----------( 150      ( 19 Jan 2018 16:39 )             28.0   PT/INR - ( 19 Jan 2018 00:34 )   PT: 10.6 sec;   INR: 0.97 ratio    PTT - ( 19 Jan 2018 00:34 )  PTT:24.6 sec    MEDICATIONS  (STANDING):  acetaminophen   Tablet. 975 milliGRAM(s) Oral every 6 hours  diphtheria/tetanus/pertussis (acellular) Vaccine (ADAcel) 0.5 milliLiter(s) IntraMuscular once  ibuprofen  Tablet 600 milliGRAM(s) Oral every 6 hours  oxyCODONE    IR 5 milliGRAM(s) Oral every 3 hours  oxytocin Infusion 41.667 milliUNIT(s)/Min (125 mL/Hr) IV Continuous <Continuous>  prenatal multivitamin 1 Tablet(s) Oral daily  sodium chloride 0.9% lock flush 3 milliLiter(s) IV Push every 8 hours    MEDICATIONS  (PRN):  dibucaine 1% Ointment 1 Application(s) Topical every 4 hours PRN Perineal Discomfort  diphenhydrAMINE   Capsule 25 milliGRAM(s) Oral every 6 hours PRN Itching  docusate sodium 100 milliGRAM(s) Oral two times a day PRN Stool Softening  glycerin Suppository - Adult 1 Suppository(s) Rectal at bedtime PRN Constipation  hydrocortisone 1% Cream 1 Application(s) Topical every 4 hours PRN Moderate to Severe Perineal Pain  lanolin Ointment 1 Application(s) Topical every 6 hours PRN Sore Nipples  magnesium hydroxide Suspension 30 milliLiter(s) Oral two times a day PRN Constipation  oxyCODONE    IR 5 milliGRAM(s) Oral every 4 hours PRN Severe Pain (7 -10)  pramoxine 1%/zinc 5% Cream 1 Application(s) Topical every 4 hours PRN Moderate to Severe Perineal Pain  simethicone 80 milliGRAM(s) Chew every 6 hours PRN Gas  witch hazel Pads 1 Application(s) Topical every 4 hours PRN Perineal Discomfort

## 2018-01-20 NOTE — PROVIDER CONTACT NOTE (OTHER) - ACTION/TREATMENT ORDERED:
Patient wishes to be discharged home, Dr. Cortes agrees to call patient if elevated and patient will return to ED for injection. Cindy verbalizes understanding.

## 2018-01-29 ENCOUNTER — TRANSCRIPTION ENCOUNTER (OUTPATIENT)
Age: 33
End: 2018-01-29

## 2018-05-22 ENCOUNTER — APPOINTMENT (OUTPATIENT)
Dept: OTOLARYNGOLOGY | Facility: CLINIC | Age: 33
End: 2018-05-22
Payer: COMMERCIAL

## 2018-05-22 VITALS
BODY MASS INDEX: 21.38 KG/M2 | HEIGHT: 66 IN | SYSTOLIC BLOOD PRESSURE: 122 MMHG | DIASTOLIC BLOOD PRESSURE: 78 MMHG | WEIGHT: 133 LBS | HEART RATE: 83 BPM

## 2018-05-22 PROCEDURE — 99243 OFF/OP CNSLTJ NEW/EST LOW 30: CPT

## 2018-05-22 RX ORDER — FOLLITROPIN 900 [IU]/1.08ML
900 INJECTION, SOLUTION SUBCUTANEOUS
Qty: 3 | Refills: 1 | Status: DISCONTINUED | COMMUNITY
Start: 2017-03-16 | End: 2018-05-22

## 2018-05-22 RX ORDER — NEEDLES, DISPOSABLE 18GX1 1/2"
18G X 1-1/2" NEEDLE, DISPOSABLE MISCELLANEOUS
Qty: 20 | Refills: 0 | Status: DISCONTINUED | COMMUNITY
Start: 2017-04-11 | End: 2018-05-22

## 2018-05-22 RX ORDER — CONTAINER,EMPTY
EACH MISCELLANEOUS
Qty: 1 | Refills: 0 | Status: DISCONTINUED | COMMUNITY
Start: 2017-02-22 | End: 2018-05-22

## 2018-05-22 RX ORDER — FOLLITROPIN ALFA 450 UNIT
450 KIT SUBCUTANEOUS
Qty: 4 | Refills: 1 | Status: DISCONTINUED | COMMUNITY
Start: 2017-02-22 | End: 2018-05-22

## 2018-05-22 RX ORDER — PROGESTERONE 50 MG/ML
50 INJECTION, SOLUTION INTRAMUSCULAR
Qty: 1 | Refills: 2 | Status: DISCONTINUED | COMMUNITY
Start: 2017-04-11 | End: 2018-05-22

## 2018-05-22 RX ORDER — DOXYCYCLINE HYCLATE 100 MG/1
100 CAPSULE ORAL TWICE DAILY
Qty: 36 | Refills: 0 | Status: DISCONTINUED | COMMUNITY
Start: 2017-02-23 | End: 2018-05-22

## 2018-05-22 RX ORDER — MENOTROPINS 75 UNIT
75 KIT SUBCUTANEOUS
Qty: 10 | Refills: 2 | Status: DISCONTINUED | COMMUNITY
Start: 2017-03-07 | End: 2018-05-22

## 2018-05-22 RX ORDER — ESTRADIOL 2 MG/1
2 TABLET ORAL
Qty: 90 | Refills: 1 | Status: DISCONTINUED | COMMUNITY
Start: 2017-04-11 | End: 2018-05-22

## 2018-05-22 RX ORDER — CHORIOGONADOTROPIN ALFA 250 UG/.5ML
250 INJECTION, SOLUTION SUBCUTANEOUS
Qty: 1 | Refills: 2 | Status: DISCONTINUED | COMMUNITY
Start: 2017-01-27 | End: 2018-05-22

## 2018-05-22 RX ORDER — FOLLITROPIN 900 [IU]/1.08ML
900 INJECTION, SOLUTION SUBCUTANEOUS
Qty: 1 | Refills: 1 | Status: DISCONTINUED | COMMUNITY
Start: 2017-02-23 | End: 2018-05-22

## 2018-05-22 RX ORDER — DESOGESTREL AND ETHINYL ESTRADIOL 0.15-0.03
0.15-3 KIT ORAL
Qty: 28 | Refills: 0 | Status: DISCONTINUED | COMMUNITY
Start: 2017-02-23 | End: 2018-05-22

## 2018-05-22 RX ORDER — GANIRELIX ACETATE 250 UG/.5ML
250 INJECTION, SOLUTION SUBCUTANEOUS
Qty: 5 | Refills: 1 | Status: DISCONTINUED | COMMUNITY
Start: 2017-02-23 | End: 2018-05-22

## 2018-05-22 RX ORDER — SYRINGE WITH NEEDLE, 1 ML 25GX5/8"
23G X 1" SYRINGE, EMPTY DISPOSABLE MISCELLANEOUS
Qty: 20 | Refills: 0 | Status: DISCONTINUED | COMMUNITY
Start: 2017-05-24 | End: 2018-05-22

## 2018-05-22 RX ORDER — NEEDLES, DISPOSABLE 25GX5/8"
27G X 1/2" NEEDLE, DISPOSABLE MISCELLANEOUS
Qty: 20 | Refills: 0 | Status: DISCONTINUED | COMMUNITY
Start: 2017-02-22 | End: 2018-05-22

## 2018-05-22 RX ORDER — LETROZOLE TABLETS 2.5 MG/1
2.5 TABLET, FILM COATED ORAL
Qty: 10 | Refills: 0 | Status: DISCONTINUED | COMMUNITY
Start: 2017-01-27 | End: 2018-05-22

## 2018-05-22 RX ORDER — MENOTROPINS 75 UNIT
75 KIT SUBCUTANEOUS
Qty: 25 | Refills: 1 | Status: DISCONTINUED | COMMUNITY
Start: 2017-02-23 | End: 2018-05-22

## 2018-05-22 RX ORDER — ISOPROPYL ALCOHOL 0.7 ML/ML
SWAB TOPICAL
Qty: 50 | Refills: 0 | Status: DISCONTINUED | COMMUNITY
Start: 2017-02-22 | End: 2018-05-22

## 2018-05-22 RX ORDER — DOXYCYCLINE HYCLATE 100 MG/1
100 CAPSULE ORAL TWICE DAILY
Qty: 36 | Refills: 0 | Status: DISCONTINUED | COMMUNITY
Start: 2017-02-22 | End: 2018-05-22

## 2018-05-22 RX ORDER — NEEDLES, FILTER 19GX1 1/2"
22G X 1-1/2" NEEDLE, DISPOSABLE MISCELLANEOUS
Qty: 15 | Refills: 2 | Status: DISCONTINUED | COMMUNITY
Start: 2017-02-23 | End: 2018-05-22

## 2018-05-22 RX ORDER — CHORIOGONADOTROPIN ALFA 10000 UNIT
10000 KIT INTRAMUSCULAR
Qty: 1 | Refills: 0 | Status: DISCONTINUED | COMMUNITY
Start: 2017-02-22 | End: 2018-05-22

## 2018-05-22 RX ORDER — CETRORELIX ACETATE 0.25 MG
0.25 KIT SUBCUTANEOUS
Qty: 8 | Refills: 1 | Status: DISCONTINUED | COMMUNITY
Start: 2017-02-22 | End: 2018-05-22

## 2018-05-22 RX ORDER — PROGESTERONE 50 MG/ML
50 INJECTION, SOLUTION INTRAMUSCULAR
Qty: 3 | Refills: 3 | Status: DISCONTINUED | COMMUNITY
Start: 2017-05-17 | End: 2018-05-22

## 2018-05-22 RX ORDER — FOLLITROPIN 900 [IU]/1.08ML
900 INJECTION, SOLUTION SUBCUTANEOUS
Qty: 3 | Refills: 1 | Status: DISCONTINUED | COMMUNITY
Start: 2017-03-07 | End: 2018-05-22

## 2018-05-22 RX ORDER — NEEDLES, DISPOSABLE 22GX1 1/2"
22G X 1-1/2" NEEDLE, DISPOSABLE MISCELLANEOUS
Qty: 20 | Refills: 0 | Status: DISCONTINUED | COMMUNITY
Start: 2017-04-11 | End: 2018-05-22

## 2018-05-22 RX ORDER — NEEDLES, DISPOSABLE 25GX5/8"
18G X 1-1/2" NEEDLE, DISPOSABLE MISCELLANEOUS
Qty: 30 | Refills: 2 | Status: DISCONTINUED | COMMUNITY
Start: 2017-05-17 | End: 2018-05-22

## 2018-05-22 RX ORDER — MENOTROPINS 75 UNIT
75 KIT SUBCUTANEOUS
Qty: 10 | Refills: 1 | Status: DISCONTINUED | COMMUNITY
Start: 2017-03-24 | End: 2018-05-22

## 2018-05-22 RX ORDER — METHYLPREDNISOLONE 8 MG/1
8 TABLET ORAL
Qty: 12 | Refills: 0 | Status: DISCONTINUED | COMMUNITY
Start: 2017-02-22 | End: 2018-05-22

## 2018-05-22 RX ORDER — SYRINGE WITH NEEDLE, 1 ML 25GX5/8"
22G X 1-1/2" SYRINGE, EMPTY DISPOSABLE MISCELLANEOUS
Qty: 20 | Refills: 0 | Status: DISCONTINUED | COMMUNITY
Start: 2017-02-22 | End: 2018-05-22

## 2018-05-22 RX ORDER — NEEDLES, DISPOSABLE 25GX1"
27G X 1/2" NEEDLE, DISPOSABLE MISCELLANEOUS
Qty: 20 | Refills: 0 | Status: DISCONTINUED | COMMUNITY
Start: 2017-02-23 | End: 2018-05-22

## 2018-05-22 RX ORDER — MENOTROPINS 75 UNIT
75 KIT SUBCUTANEOUS
Qty: 25 | Refills: 1 | Status: DISCONTINUED | COMMUNITY
Start: 2017-02-22 | End: 2018-05-22

## 2018-05-22 RX ORDER — CHORIONIC GONADOTROPIN 10000 UNIT
10000 KIT INTRAMUSCULAR
Qty: 1 | Refills: 0 | Status: DISCONTINUED | COMMUNITY
Start: 2017-02-23 | End: 2018-05-22

## 2018-05-22 RX ORDER — NORETHINDRONE AND ETHINYL ESTRADIOL 1 MG-35MCG
1-35 KIT ORAL DAILY
Qty: 1 | Refills: 1 | Status: DISCONTINUED | COMMUNITY
Start: 2017-02-22 | End: 2018-05-22

## 2018-05-22 RX ORDER — SYRINGE WITH NEEDLE, 1 ML 25GX5/8"
23G X 1" SYRINGE, EMPTY DISPOSABLE MISCELLANEOUS
Qty: 30 | Refills: 0 | Status: DISCONTINUED | COMMUNITY
Start: 2017-05-17 | End: 2018-05-22

## 2018-06-13 ENCOUNTER — APPOINTMENT (OUTPATIENT)
Dept: OTOLARYNGOLOGY | Facility: CLINIC | Age: 33
End: 2018-06-13
Payer: COMMERCIAL

## 2018-06-13 VITALS
WEIGHT: 133 LBS | SYSTOLIC BLOOD PRESSURE: 108 MMHG | DIASTOLIC BLOOD PRESSURE: 72 MMHG | BODY MASS INDEX: 21.38 KG/M2 | HEART RATE: 74 BPM | HEIGHT: 66 IN

## 2018-06-13 DIAGNOSIS — Z78.9 OTHER SPECIFIED HEALTH STATUS: ICD-10-CM

## 2018-06-13 PROCEDURE — 31231 NASAL ENDOSCOPY DX: CPT

## 2018-06-13 PROCEDURE — 99214 OFFICE O/P EST MOD 30 MIN: CPT | Mod: 25

## 2018-08-24 ENCOUNTER — OUTPATIENT (OUTPATIENT)
Dept: OUTPATIENT SERVICES | Facility: HOSPITAL | Age: 33
LOS: 1 days | End: 2018-08-24

## 2018-08-24 VITALS
DIASTOLIC BLOOD PRESSURE: 70 MMHG | HEIGHT: 66 IN | HEART RATE: 77 BPM | WEIGHT: 134.92 LBS | RESPIRATION RATE: 14 BRPM | OXYGEN SATURATION: 99 % | TEMPERATURE: 98 F | SYSTOLIC BLOOD PRESSURE: 108 MMHG

## 2018-08-24 DIAGNOSIS — J34.2 DEVIATED NASAL SEPTUM: ICD-10-CM

## 2018-08-24 DIAGNOSIS — J34.2 DEVIATED NASAL SEPTUM: Chronic | ICD-10-CM

## 2018-08-24 LAB
HCT VFR BLD CALC: 40.7 % — SIGNIFICANT CHANGE UP (ref 34.5–45)
HGB BLD-MCNC: 12.9 G/DL — SIGNIFICANT CHANGE UP (ref 11.5–15.5)
MCHC RBC-ENTMCNC: 30.1 PG — SIGNIFICANT CHANGE UP (ref 27–34)
MCHC RBC-ENTMCNC: 31.7 % — LOW (ref 32–36)
MCV RBC AUTO: 94.9 FL — SIGNIFICANT CHANGE UP (ref 80–100)
NRBC # FLD: 0 — SIGNIFICANT CHANGE UP
PLATELET # BLD AUTO: 333 K/UL — SIGNIFICANT CHANGE UP (ref 150–400)
PMV BLD: 10 FL — SIGNIFICANT CHANGE UP (ref 7–13)
RBC # BLD: 4.29 M/UL — SIGNIFICANT CHANGE UP (ref 3.8–5.2)
RBC # FLD: 13.1 % — SIGNIFICANT CHANGE UP (ref 10.3–14.5)
WBC # BLD: 6.22 K/UL — SIGNIFICANT CHANGE UP (ref 3.8–10.5)
WBC # FLD AUTO: 6.22 K/UL — SIGNIFICANT CHANGE UP (ref 3.8–10.5)

## 2018-08-24 RX ORDER — SODIUM CHLORIDE 9 MG/ML
1000 INJECTION, SOLUTION INTRAVENOUS
Qty: 0 | Refills: 0 | Status: DISCONTINUED | OUTPATIENT
Start: 2018-09-11 | End: 2018-09-12

## 2018-08-24 NOTE — H&P PST ADULT - NEGATIVE GENERAL GENITOURINARY SYMPTOMS
no urine discoloration/no flank pain L/no dysuria/normal urinary frequency/no flank pain R/no incontinence/no urinary hesitancy/no bladder infections/no renal colic/no hematuria

## 2018-08-24 NOTE — H&P PST ADULT - PROBLEM SELECTOR PLAN 1
Septoplasty, turbinectomy, rhinoplasty scheduled on 9/11/2018.   Preop instructions, pepcid, surgical scrub provided. Pt stated understanding.

## 2018-08-24 NOTE — H&P PST ADULT - NEGATIVE OPHTHALMOLOGIC SYMPTOMS
no irritation L/no irritation R/no loss of vision R/no blurred vision L/no loss of vision L/contacts/ eye glasses/no lacrimation L/no lacrimation R/no discharge L/no photophobia/no blurred vision R/no discharge R/no pain L/no diplopia

## 2018-08-24 NOTE — H&P PST ADULT - NEGATIVE ENMT SYMPTOMS
no dry mouth/no recurrent cold sores/no tinnitus/no dysphagia/no gum bleeding/no throat pain/no hearing difficulty/no ear pain/no nasal congestion/no nasal obstruction/no post-nasal discharge/no nose bleeds/no nasal discharge/no vertigo/no sinus symptoms no sinus symptoms/no nasal discharge/no post-nasal discharge/no gum bleeding/no throat pain/no nasal obstruction/no hearing difficulty/no dysphagia/no nose bleeds/no recurrent cold sores/no dry mouth/no ear pain/no tinnitus/no vertigo

## 2018-08-24 NOTE — H&P PST ADULT - NEGATIVE ALLERGY TYPES
no reactions to food/no outdoor environmental allergies/no indoor environmental allergies/no reactions to medicines/no reactions to animals/no reactions to insect bites

## 2018-08-24 NOTE — H&P PST ADULT - FAMILY HISTORY
Mother  Still living? Yes, Estimated age: Age Unknown  Family history of hypertension, Age at diagnosis: Age Unknown  Family history of breast cancer in mother, Age at diagnosis: Age Unknown

## 2018-08-24 NOTE — H&P PST ADULT - NEGATIVE MUSCULOSKELETAL SYMPTOMS
no joint swelling/no myalgia/no arthritis/no muscle cramps/no leg pain L/no muscle weakness/no neck pain/no stiffness/no arm pain R/no arthralgia/no back pain/no leg pain R/no arm pain L

## 2018-08-24 NOTE — H&P PST ADULT - MUSCULOSKELETAL
details… detailed exam ROM intact/no joint erythema/no joint warmth/no calf tenderness/normal strength/no joint swelling

## 2018-08-24 NOTE — H&P PST ADULT - NSANTHOSAYNRD_GEN_A_CORE
No. GREGORY screening performed.  STOP BANG Legend: 0-2 = LOW Risk; 3-4 = INTERMEDIATE Risk; 5-8 = HIGH Risk

## 2018-08-24 NOTE — H&P PST ADULT - NEGATIVE GENERAL SYMPTOMS
no chills/no sweating/no weight loss/no anorexia/no polyphagia/no fatigue/no malaise/no weight gain/no polydipsia/no fever/no polyuria

## 2018-08-24 NOTE — H&P PST ADULT - HISTORY OF PRESENT ILLNESS
33 year old female with a history of chronic congestion and deviated septum. Patient was referred to Dr. Tolbert for an evaluation. Patient is scheduled for Septoplasty, turbinectomy, rhinoplasty scheduled on 9/11/2018.

## 2018-08-24 NOTE — H&P PST ADULT - RS GEN PE MLT RESP DETAILS PC
no rales/clear to auscultation bilaterally/no rhonchi/breath sounds equal/respirations non-labored/no wheezes/good air movement/airway patent

## 2018-08-31 ENCOUNTER — APPOINTMENT (OUTPATIENT)
Dept: OTOLARYNGOLOGY | Facility: CLINIC | Age: 33
End: 2018-08-31
Payer: COMMERCIAL

## 2018-08-31 VITALS
SYSTOLIC BLOOD PRESSURE: 114 MMHG | HEIGHT: 66 IN | HEART RATE: 76 BPM | DIASTOLIC BLOOD PRESSURE: 71 MMHG | BODY MASS INDEX: 21.21 KG/M2 | WEIGHT: 132 LBS

## 2018-08-31 PROCEDURE — 31231 NASAL ENDOSCOPY DX: CPT

## 2018-08-31 PROCEDURE — 99214 OFFICE O/P EST MOD 30 MIN: CPT | Mod: 25

## 2018-08-31 RX ORDER — FLUTICASONE PROPIONATE 50 UG/1
50 SPRAY, METERED NASAL DAILY
Qty: 1 | Refills: 5 | Status: DISCONTINUED | COMMUNITY
Start: 2018-06-13 | End: 2018-08-31

## 2018-09-10 ENCOUNTER — TRANSCRIPTION ENCOUNTER (OUTPATIENT)
Age: 33
End: 2018-09-10

## 2018-09-11 ENCOUNTER — APPOINTMENT (OUTPATIENT)
Dept: OTOLARYNGOLOGY | Facility: HOSPITAL | Age: 33
End: 2018-09-11

## 2018-09-11 ENCOUNTER — RESULT REVIEW (OUTPATIENT)
Age: 33
End: 2018-09-11

## 2018-09-11 ENCOUNTER — OUTPATIENT (OUTPATIENT)
Dept: OUTPATIENT SERVICES | Facility: HOSPITAL | Age: 33
LOS: 1 days | Discharge: HOME | End: 2018-09-11
Payer: COMMERCIAL

## 2018-09-11 VITALS
SYSTOLIC BLOOD PRESSURE: 112 MMHG | RESPIRATION RATE: 20 BRPM | TEMPERATURE: 98 F | OXYGEN SATURATION: 100 % | HEIGHT: 66 IN | HEART RATE: 85 BPM | WEIGHT: 134.92 LBS | DIASTOLIC BLOOD PRESSURE: 74 MMHG

## 2018-09-11 VITALS
SYSTOLIC BLOOD PRESSURE: 124 MMHG | RESPIRATION RATE: 14 BRPM | OXYGEN SATURATION: 97 % | HEART RATE: 90 BPM | DIASTOLIC BLOOD PRESSURE: 76 MMHG

## 2018-09-11 DIAGNOSIS — J34.2 DEVIATED NASAL SEPTUM: ICD-10-CM

## 2018-09-11 DIAGNOSIS — J34.2 DEVIATED NASAL SEPTUM: Chronic | ICD-10-CM

## 2018-09-11 LAB — HCG UR QL: NEGATIVE — SIGNIFICANT CHANGE UP

## 2018-09-11 PROCEDURE — 30400 RECONSTRUCTION OF NOSE: CPT

## 2018-09-11 PROCEDURE — 30130 EXCISE INFERIOR TURBINATE: CPT | Mod: 50,GC,77

## 2018-09-11 PROCEDURE — 88304 TISSUE EXAM BY PATHOLOGIST: CPT | Mod: 26

## 2018-09-11 PROCEDURE — 30520 REPAIR OF NASAL SEPTUM: CPT | Mod: 77,GC

## 2018-09-11 PROCEDURE — 88311 DECALCIFY TISSUE: CPT | Mod: 26

## 2018-09-11 RX ORDER — OXYCODONE HYDROCHLORIDE 5 MG/1
1 TABLET ORAL
Qty: 30 | Refills: 0
Start: 2018-09-11

## 2018-09-11 RX ORDER — CEPHALEXIN 500 MG
1 CAPSULE ORAL
Qty: 20 | Refills: 0
Start: 2018-09-11 | End: 2018-09-20

## 2018-09-11 NOTE — ASU DISCHARGE PLAN (ADULT/PEDIATRIC). - NOTIFY
Persistent Nausea and Vomiting/Swelling that continues/Inability to Tolerate Liquids or Foods/Pain not relieved by Medications/Bleeding that does not stop Swelling that continues/Bleeding that does not stop/Pain not relieved by Medications/Persistent Nausea and Vomiting/Inability to Tolerate Liquids or Foods/Fever greater than 101

## 2018-09-11 NOTE — ASU DISCHARGE PLAN (ADULT/PEDIATRIC). - MEDICATION SUMMARY - MEDICATIONS TO TAKE
I will START or STAY ON the medications listed below when I get home from the hospital:    acetaminophen-oxyCODONE 325 mg-5 mg oral tablet  -- 1 tab(s) by mouth 4 to 6 times a day MDD:8  -- Caution federal law prohibits the transfer of this drug to any person other  than the person for whom it was prescribed.  May cause drowsiness.  Alcohol may intensify this effect.  Use care when operating dangerous machinery.  This prescription cannot be refilled.  This product contains acetaminophen.  Do not use  with any other product containing acetaminophen to prevent possible liver damage.  Using more of this medication than prescribed may cause serious breathing problems.    -- Indication: For as needed for pain medication to be alternated with tylenol.    Keflex 500 mg oral capsule  -- 1 cap(s) by mouth 2 times a day  -- Finish all this medication unless otherwise directed by prescriber.    -- Indication: For antibiotic x 10 days

## 2018-09-11 NOTE — ASU DISCHARGE PLAN (ADULT/PEDIATRIC). - NURSING INSTRUCTIONS
You were given 1000mg IV Tylenol for pain management.  Please DO NOT take any Tylenol containing products, such as  Vicodin, Percocet, Excedrin, many cold preparations for the next 6 hours (until 330p).  DO NOT EXCEED 3000MG OF TYLENOL OVER 24 HOURS.

## 2018-09-12 ENCOUNTER — APPOINTMENT (OUTPATIENT)
Dept: OTOLARYNGOLOGY | Facility: CLINIC | Age: 33
End: 2018-09-12
Payer: COMMERCIAL

## 2018-09-12 PROBLEM — J34.2 DEVIATED NASAL SEPTUM: Chronic | Status: ACTIVE | Noted: 2018-08-24

## 2018-09-12 PROBLEM — J34.3 HYPERTROPHY OF NASAL TURBINATES: Chronic | Status: ACTIVE | Noted: 2018-08-24

## 2018-09-12 PROCEDURE — 99024 POSTOP FOLLOW-UP VISIT: CPT

## 2018-09-14 LAB — SURGICAL PATHOLOGY STUDY: SIGNIFICANT CHANGE UP

## 2018-09-19 ENCOUNTER — APPOINTMENT (OUTPATIENT)
Dept: OTOLARYNGOLOGY | Facility: CLINIC | Age: 33
End: 2018-09-19
Payer: COMMERCIAL

## 2018-09-19 PROCEDURE — 99024 POSTOP FOLLOW-UP VISIT: CPT

## 2019-01-30 ENCOUNTER — APPOINTMENT (OUTPATIENT)
Dept: OTOLARYNGOLOGY | Facility: CLINIC | Age: 34
End: 2019-01-30
Payer: COMMERCIAL

## 2019-01-30 VITALS
HEIGHT: 66 IN | DIASTOLIC BLOOD PRESSURE: 67 MMHG | HEART RATE: 69 BPM | BODY MASS INDEX: 20.89 KG/M2 | SYSTOLIC BLOOD PRESSURE: 104 MMHG | WEIGHT: 130 LBS

## 2019-01-30 DIAGNOSIS — Z82.5 FAMILY HISTORY OF ASTHMA AND OTHER CHRONIC LOWER RESPIRATORY DISEASES: ICD-10-CM

## 2019-01-30 DIAGNOSIS — J34.2 DEVIATED NASAL SEPTUM: ICD-10-CM

## 2019-01-30 DIAGNOSIS — Z80.3 FAMILY HISTORY OF MALIGNANT NEOPLASM OF BREAST: ICD-10-CM

## 2019-01-30 PROCEDURE — 31231 NASAL ENDOSCOPY DX: CPT

## 2019-01-30 PROCEDURE — 99214 OFFICE O/P EST MOD 30 MIN: CPT | Mod: 25

## 2019-01-31 PROBLEM — J34.2 DEVIATED NASAL SEPTUM: Status: ACTIVE | Noted: 2018-05-22

## 2019-01-31 NOTE — REASON FOR VISIT
[Subsequent Evaluation] : a subsequent evaluation for [Other: _____] : [unfilled] [FreeTextEntry2] : follow up for 3 month check up s/p revision septoplasty and turbinectomy 9/11/2018

## 2019-01-31 NOTE — HISTORY OF PRESENT ILLNESS
[SMR] : submucous resection (SMR) [de-identified] : 33 year old female follow up for 3 month check up s/p revision septoplasty and turbinectomy 9/11/2018.  Denies nasal congestion, sinus pain/pressure, post nasal drip, nasal discharge. Patient states no sinus infections since surgery, no difficulty breathing, doing well.

## 2019-01-31 NOTE — PROCEDURE
[Topical Lidocaine] : topical lidocaine [Oxymetazoline HCl] : oxymetazoline HCl [Flexible Endoscope] : examined with the flexible endoscope [Serial Number: ___] : Serial Number: [unfilled] [Image(s) Captured] : image(s) captured and filed [Anatomical Abnormality] : anatomical abnormality [Anterior rhinoscopy insufficient to account for symptoms] : anterior rhinoscopy insufficient to account for symptoms [Rigid Endoscope] : examined with a rigid endoscope [Normal] : the septum showed no abnormalities [FreeTextEntry6] : Pre-op indication(s): Deviated septum\par Post-op indication(s): corrected\par Verbal consent obtained from patient.\par “Anterior rhinoscopy insufficient to account for symptoms” \par Details for procedure: \par Scope #: 60\par Type of scope:    flexible fiber optic telescope X   Rigid glass telescope \par Anesthesia and/or vasoconstriction was achieved topically by using: \par 4% Lidocaine spray   0.05% Oxymetazoline     Other ______ \par The following anatomic sites were directly examined in a sequential fashion: \par The scope was introduced in the nasal passage between the middle and inferior turbinates to exam the inferior portion of the middle meatus and the fontanelle, as well as the maxillary ostia. Next, the scope was passed medially and posteriorly to the middle turbinates to examine the sphenoethmoid recess and the superior turbinate region. \par Upon visualization the finders are as follows: \par Nasal Septum:   Normal\par Bleeding site cauterized:    Anterior   left   right   Posterior   left   right \par Method:   Silver Nitrate   YAG Laser    Electrocautery ______ \par Right Side: \par * Mucosa: Normal\par * Mucous: Normal\par * Polyp: Normal\par * Inferior Turbinate: Normal\par * Middle Turbinate: Normal\par * Superior Turbinate: Normal\par * Inferior Meatus: Normal\par * Middle Meatus: Normal\par * Super Meatus: Normal\par * Sphenoethmoidal Recess: Normal\par Left Side: \par * Mucosa: Normal\par * Mucous: Normal\par * Polyp: Normal\par * Inferior Turbinate: Normal\par * Middle Turbinate: Normal\par * Superior Turbinate: Normal\par * Inferior Meatus: Normal\par * Middle Meatus: Normal\par * Super Meatus: Normal\par * Sphenoethmoidal Recess: Normal\par The patient tolerated the procedure well without any complications.\par \par \par

## 2019-07-10 ENCOUNTER — APPOINTMENT (OUTPATIENT)
Dept: MAMMOGRAPHY | Facility: CLINIC | Age: 34
End: 2019-07-10
Payer: COMMERCIAL

## 2019-07-10 ENCOUNTER — OUTPATIENT (OUTPATIENT)
Dept: OUTPATIENT SERVICES | Facility: HOSPITAL | Age: 34
LOS: 1 days | End: 2019-07-10
Payer: COMMERCIAL

## 2019-07-10 ENCOUNTER — APPOINTMENT (OUTPATIENT)
Dept: ULTRASOUND IMAGING | Facility: CLINIC | Age: 34
End: 2019-07-10
Payer: COMMERCIAL

## 2019-07-10 DIAGNOSIS — Z00.8 ENCOUNTER FOR OTHER GENERAL EXAMINATION: ICD-10-CM

## 2019-07-10 DIAGNOSIS — J34.2 DEVIATED NASAL SEPTUM: Chronic | ICD-10-CM

## 2019-07-10 PROCEDURE — 76856 US EXAM PELVIC COMPLETE: CPT | Mod: 26

## 2019-07-10 PROCEDURE — 76856 US EXAM PELVIC COMPLETE: CPT

## 2019-07-10 PROCEDURE — 76641 ULTRASOUND BREAST COMPLETE: CPT | Mod: 26,50

## 2019-07-10 PROCEDURE — 76641 ULTRASOUND BREAST COMPLETE: CPT

## 2019-12-09 ENCOUNTER — APPOINTMENT (OUTPATIENT)
Dept: INTERNAL MEDICINE | Facility: CLINIC | Age: 34
End: 2019-12-09
Payer: COMMERCIAL

## 2019-12-09 VITALS
HEART RATE: 71 BPM | WEIGHT: 133 LBS | DIASTOLIC BLOOD PRESSURE: 84 MMHG | TEMPERATURE: 98.1 F | OXYGEN SATURATION: 99 % | HEIGHT: 66 IN | BODY MASS INDEX: 21.38 KG/M2 | SYSTOLIC BLOOD PRESSURE: 125 MMHG | RESPIRATION RATE: 16 BRPM

## 2019-12-09 DIAGNOSIS — Z87.898 PERSONAL HISTORY OF OTHER SPECIFIED CONDITIONS: ICD-10-CM

## 2019-12-09 DIAGNOSIS — R06.89 OTHER ABNORMALITIES OF BREATHING: ICD-10-CM

## 2019-12-09 DIAGNOSIS — Z86.39 PERSONAL HISTORY OF OTHER ENDOCRINE, NUTRITIONAL AND METABOLIC DISEASE: ICD-10-CM

## 2019-12-09 DIAGNOSIS — M95.0 ACQUIRED DEFORMITY OF NOSE: ICD-10-CM

## 2019-12-09 DIAGNOSIS — Z87.42 PERSONAL HISTORY OF OTHER DISEASES OF THE FEMALE GENITAL TRACT: ICD-10-CM

## 2019-12-09 DIAGNOSIS — R22.1 LOCALIZED SWELLING, MASS AND LUMP, NECK: ICD-10-CM

## 2019-12-09 DIAGNOSIS — Z13.71 ENCOUNTER FOR NONPROCREATIVE SCREENING FOR GENETIC DISEASE CARRIER STATUS: ICD-10-CM

## 2019-12-09 PROCEDURE — 99385 PREV VISIT NEW AGE 18-39: CPT

## 2019-12-16 ENCOUNTER — TRANSCRIPTION ENCOUNTER (OUTPATIENT)
Age: 34
End: 2019-12-16

## 2019-12-16 LAB
ALBUMIN SERPL ELPH-MCNC: 4.7 G/DL
ALP BLD-CCNC: 63 U/L
ALT SERPL-CCNC: 12 U/L
ANION GAP SERPL CALC-SCNC: 11 MMOL/L
AST SERPL-CCNC: 17 U/L
BASOPHILS # BLD AUTO: 0.08 K/UL
BASOPHILS NFR BLD AUTO: 1.1 %
BILIRUB SERPL-MCNC: 0.4 MG/DL
BUN SERPL-MCNC: 14 MG/DL
CALCIUM SERPL-MCNC: 10 MG/DL
CHLORIDE SERPL-SCNC: 105 MMOL/L
CHOLEST SERPL-MCNC: 198 MG/DL
CHOLEST/HDLC SERPL: 2.6 RATIO
CO2 SERPL-SCNC: 25 MMOL/L
CREAT SERPL-MCNC: 0.78 MG/DL
EOSINOPHIL # BLD AUTO: 0.23 K/UL
EOSINOPHIL NFR BLD AUTO: 3.2 %
GLUCOSE SERPL-MCNC: 99 MG/DL
HCT VFR BLD CALC: 41.1 %
HDLC SERPL-MCNC: 77 MG/DL
HGB BLD-MCNC: 12.9 G/DL
IMM GRANULOCYTES NFR BLD AUTO: 0.4 %
LDLC SERPL CALC-MCNC: 106 MG/DL
LYMPHOCYTES # BLD AUTO: 2.47 K/UL
LYMPHOCYTES NFR BLD AUTO: 34.7 %
MAN DIFF?: NORMAL
MCHC RBC-ENTMCNC: 30.2 PG
MCHC RBC-ENTMCNC: 31.4 GM/DL
MCV RBC AUTO: 96.3 FL
MONOCYTES # BLD AUTO: 0.51 K/UL
MONOCYTES NFR BLD AUTO: 7.2 %
NEUTROPHILS # BLD AUTO: 3.8 K/UL
NEUTROPHILS NFR BLD AUTO: 53.4 %
PLATELET # BLD AUTO: 372 K/UL
POTASSIUM SERPL-SCNC: 5.4 MMOL/L
PROT SERPL-MCNC: 6.8 G/DL
RBC # BLD: 4.27 M/UL
RBC # FLD: 12.9 %
SODIUM SERPL-SCNC: 141 MMOL/L
TRIGL SERPL-MCNC: 76 MG/DL
TSH SERPL-ACNC: 3.25 UIU/ML
WBC # FLD AUTO: 7.12 K/UL

## 2019-12-20 ENCOUNTER — APPOINTMENT (OUTPATIENT)
Dept: HUMAN REPRODUCTION | Facility: CLINIC | Age: 34
End: 2019-12-20
Payer: COMMERCIAL

## 2019-12-20 PROCEDURE — 99214 OFFICE O/P EST MOD 30 MIN: CPT

## 2020-01-05 ENCOUNTER — APPOINTMENT (OUTPATIENT)
Dept: HUMAN REPRODUCTION | Facility: CLINIC | Age: 35
End: 2020-01-05

## 2020-01-08 ENCOUNTER — APPOINTMENT (OUTPATIENT)
Dept: HUMAN REPRODUCTION | Facility: CLINIC | Age: 35
End: 2020-01-08
Payer: COMMERCIAL

## 2020-01-08 PROCEDURE — 58999I: CUSTOM

## 2020-01-08 PROCEDURE — 58340 CATHETER FOR HYSTEROGRAPHY: CPT

## 2020-01-08 PROCEDURE — 76831 ECHO EXAM UTERUS: CPT

## 2020-01-08 PROCEDURE — 99213 OFFICE O/P EST LOW 20 MIN: CPT | Mod: 25

## 2020-01-22 ENCOUNTER — APPOINTMENT (OUTPATIENT)
Dept: HUMAN REPRODUCTION | Facility: CLINIC | Age: 35
End: 2020-01-22
Payer: COMMERCIAL

## 2020-01-22 PROCEDURE — 76830 TRANSVAGINAL US NON-OB: CPT

## 2020-01-22 PROCEDURE — 99213 OFFICE O/P EST LOW 20 MIN: CPT | Mod: 25

## 2020-01-29 ENCOUNTER — APPOINTMENT (OUTPATIENT)
Dept: HUMAN REPRODUCTION | Facility: CLINIC | Age: 35
End: 2020-01-29
Payer: COMMERCIAL

## 2020-01-29 PROCEDURE — 76830 TRANSVAGINAL US NON-OB: CPT

## 2020-01-29 PROCEDURE — 99213 OFFICE O/P EST LOW 20 MIN: CPT | Mod: 25

## 2020-02-03 ENCOUNTER — APPOINTMENT (OUTPATIENT)
Dept: HUMAN REPRODUCTION | Facility: CLINIC | Age: 35
End: 2020-02-03

## 2020-02-10 ENCOUNTER — APPOINTMENT (OUTPATIENT)
Dept: HUMAN REPRODUCTION | Facility: CLINIC | Age: 35
End: 2020-02-10
Payer: COMMERCIAL

## 2020-02-10 PROCEDURE — 89398 UNLISTED REPROD MED LAB PROC: CPT

## 2020-02-10 PROCEDURE — 89250 CULTR OOCYTE/EMBRYO <4 DAYS: CPT

## 2020-02-10 PROCEDURE — 89255 PREPARE EMBRYO FOR TRANSFER: CPT

## 2020-02-10 PROCEDURE — 58974 EMBRYO TRANSFER INTRAUTERINE: CPT

## 2020-02-10 PROCEDURE — 76705 ECHO EXAM OF ABDOMEN: CPT

## 2020-02-10 PROCEDURE — 89352 THAWING CRYOPRESRVED EMBRYO: CPT

## 2020-02-21 ENCOUNTER — APPOINTMENT (OUTPATIENT)
Dept: HUMAN REPRODUCTION | Facility: CLINIC | Age: 35
End: 2020-02-21

## 2020-02-27 ENCOUNTER — APPOINTMENT (OUTPATIENT)
Dept: HUMAN REPRODUCTION | Facility: CLINIC | Age: 35
End: 2020-02-27
Payer: COMMERCIAL

## 2020-02-27 PROCEDURE — 99213 OFFICE O/P EST LOW 20 MIN: CPT | Mod: 25

## 2020-02-27 PROCEDURE — 76817 TRANSVAGINAL US OBSTETRIC: CPT

## 2020-02-29 ENCOUNTER — APPOINTMENT (OUTPATIENT)
Dept: HUMAN REPRODUCTION | Facility: CLINIC | Age: 35
End: 2020-02-29
Payer: COMMERCIAL

## 2020-02-29 PROCEDURE — 99213 OFFICE O/P EST LOW 20 MIN: CPT | Mod: 25

## 2020-02-29 PROCEDURE — 76817 TRANSVAGINAL US OBSTETRIC: CPT

## 2020-03-05 ENCOUNTER — APPOINTMENT (OUTPATIENT)
Dept: HUMAN REPRODUCTION | Facility: CLINIC | Age: 35
End: 2020-03-05
Payer: COMMERCIAL

## 2020-03-05 PROCEDURE — 99213 OFFICE O/P EST LOW 20 MIN: CPT | Mod: 25

## 2020-03-05 PROCEDURE — 76817 TRANSVAGINAL US OBSTETRIC: CPT

## 2020-03-12 ENCOUNTER — APPOINTMENT (OUTPATIENT)
Dept: HUMAN REPRODUCTION | Facility: CLINIC | Age: 35
End: 2020-03-12
Payer: COMMERCIAL

## 2020-03-12 PROCEDURE — 99213 OFFICE O/P EST LOW 20 MIN: CPT | Mod: 25

## 2020-03-12 PROCEDURE — 76817 TRANSVAGINAL US OBSTETRIC: CPT

## 2020-06-11 ENCOUNTER — ASOB RESULT (OUTPATIENT)
Age: 35
End: 2020-06-11

## 2020-06-11 ENCOUNTER — APPOINTMENT (OUTPATIENT)
Dept: ANTEPARTUM | Facility: CLINIC | Age: 35
End: 2020-06-11
Payer: COMMERCIAL

## 2020-06-11 PROCEDURE — 76811 OB US DETAILED SNGL FETUS: CPT

## 2020-06-29 ENCOUNTER — APPOINTMENT (OUTPATIENT)
Dept: ANTEPARTUM | Facility: CLINIC | Age: 35
End: 2020-06-29
Payer: COMMERCIAL

## 2020-06-29 ENCOUNTER — ASOB RESULT (OUTPATIENT)
Age: 35
End: 2020-06-29

## 2020-06-29 PROCEDURE — 93325 DOPPLER ECHO COLOR FLOW MAPG: CPT

## 2020-06-29 PROCEDURE — 76825 ECHO EXAM OF FETAL HEART: CPT

## 2020-06-29 PROCEDURE — 76827 ECHO EXAM OF FETAL HEART: CPT

## 2020-08-27 NOTE — PROGRESS NOTE ADULT - I WAS PHYSICALLY PRESENT FOR THE KEY PORTIONS OF THE EVALUATION AND MANAGEMENT (E/M) SERVICE PROVIDED.  I AGREE WITH THE ABOVE HISTORY, PHYSICAL, AND PLAN WHICH I HAVE REVIEWED AND EDITED WHERE APPROPRIATE
.      CERTIFICATE OF RETURN TO WORK    August 27, 2020      Re:   Char Foster  [phone]  [facility] [address]                        This is to certify that Char Foster has been under my care from 8/27/2020 and can return to regular work on 8/28/2020      SIGNATURE:___________________________________________,   8/27/2020      JOSUE Morel           [facility]  [facility] [place]  [address] [phone]  Ext. [phone]  
  .      CERTIFICATE OF RETURN TO WORK    August 28, 2020      Re:   Char Foster  [address]  [facility] [address]                        This is to certify that Char Foster has been under my care from 8/27/2020 and can return to regular work on 8/28/2020          SIGNATURE:___________________________________________,   8/28/2020      JOSUE Morel           [facility]  Urgent [facility] Clinic  [facility] [place]  [address] [phone]  Ext. [phone]  
Statement Selected
Statement Selected

## 2020-10-18 ENCOUNTER — INPATIENT (INPATIENT)
Facility: HOSPITAL | Age: 35
LOS: 0 days | Discharge: HOME | End: 2020-10-19
Attending: OBSTETRICS & GYNECOLOGY | Admitting: OBSTETRICS & GYNECOLOGY
Payer: COMMERCIAL

## 2020-10-18 VITALS — HEIGHT: 66 IN | WEIGHT: 169.76 LBS

## 2020-10-18 DIAGNOSIS — J34.2 DEVIATED NASAL SEPTUM: Chronic | ICD-10-CM

## 2020-10-18 DIAGNOSIS — O26.899 OTHER SPECIFIED PREGNANCY RELATED CONDITIONS, UNSPECIFIED TRIMESTER: ICD-10-CM

## 2020-10-18 DIAGNOSIS — Z34.80 ENCOUNTER FOR SUPERVISION OF OTHER NORMAL PREGNANCY, UNSPECIFIED TRIMESTER: ICD-10-CM

## 2020-10-18 DIAGNOSIS — Z3A.00 WEEKS OF GESTATION OF PREGNANCY NOT SPECIFIED: ICD-10-CM

## 2020-10-18 LAB
BASOPHILS # BLD AUTO: 0.09 K/UL — SIGNIFICANT CHANGE UP (ref 0–0.2)
BASOPHILS NFR BLD AUTO: 0.6 % — SIGNIFICANT CHANGE UP (ref 0–2)
BLD GP AB SCN SERPL QL: NEGATIVE — SIGNIFICANT CHANGE UP
EOSINOPHIL # BLD AUTO: 0.23 K/UL — SIGNIFICANT CHANGE UP (ref 0–0.5)
EOSINOPHIL NFR BLD AUTO: 1.6 % — SIGNIFICANT CHANGE UP (ref 0–6)
HCT VFR BLD CALC: 42 % — SIGNIFICANT CHANGE UP (ref 34.5–45)
HGB BLD-MCNC: 13.5 G/DL — SIGNIFICANT CHANGE UP (ref 11.5–15.5)
IMM GRANULOCYTES NFR BLD AUTO: 1.8 % — HIGH (ref 0–1.5)
LYMPHOCYTES # BLD AUTO: 16.3 % — SIGNIFICANT CHANGE UP (ref 13–44)
LYMPHOCYTES # BLD AUTO: 2.41 K/UL — SIGNIFICANT CHANGE UP (ref 1–3.3)
MCHC RBC-ENTMCNC: 29.2 PG — SIGNIFICANT CHANGE UP (ref 27–34)
MCHC RBC-ENTMCNC: 32.1 GM/DL — SIGNIFICANT CHANGE UP (ref 32–36)
MCV RBC AUTO: 90.9 FL — SIGNIFICANT CHANGE UP (ref 80–100)
MONOCYTES # BLD AUTO: 1.26 K/UL — HIGH (ref 0–0.9)
MONOCYTES NFR BLD AUTO: 8.5 % — SIGNIFICANT CHANGE UP (ref 2–14)
NEUTROPHILS # BLD AUTO: 10.55 K/UL — HIGH (ref 1.8–7.4)
NEUTROPHILS NFR BLD AUTO: 71.2 % — SIGNIFICANT CHANGE UP (ref 43–77)
NRBC # BLD: 0 /100 WBCS — SIGNIFICANT CHANGE UP (ref 0–0)
PLATELET # BLD AUTO: 248 K/UL — SIGNIFICANT CHANGE UP (ref 150–400)
RBC # BLD: 4.62 M/UL — SIGNIFICANT CHANGE UP (ref 3.8–5.2)
RBC # FLD: 12.6 % — SIGNIFICANT CHANGE UP (ref 10.3–14.5)
RH IG SCN BLD-IMP: NEGATIVE — SIGNIFICANT CHANGE UP
SARS-COV-2 RNA SPEC QL NAA+PROBE: SIGNIFICANT CHANGE UP
WBC # BLD: 14.81 K/UL — HIGH (ref 3.8–10.5)
WBC # FLD AUTO: 14.81 K/UL — HIGH (ref 3.8–10.5)

## 2020-10-18 RX ORDER — TRANEXAMIC ACID 100 MG/ML
1000 INJECTION, SOLUTION INTRAVENOUS ONCE
Refills: 0 | Status: COMPLETED | OUTPATIENT
Start: 2020-10-18 | End: 2020-10-18

## 2020-10-18 RX ORDER — PRAMOXINE HYDROCHLORIDE 150 MG/15G
1 AEROSOL, FOAM RECTAL EVERY 4 HOURS
Refills: 0 | Status: DISCONTINUED | OUTPATIENT
Start: 2020-10-18 | End: 2020-10-19

## 2020-10-18 RX ORDER — BENZOCAINE 10 %
1 GEL (GRAM) MUCOUS MEMBRANE EVERY 6 HOURS
Refills: 0 | Status: DISCONTINUED | OUTPATIENT
Start: 2020-10-18 | End: 2020-10-19

## 2020-10-18 RX ORDER — TETANUS TOXOID, REDUCED DIPHTHERIA TOXOID AND ACELLULAR PERTUSSIS VACCINE, ADSORBED 5; 2.5; 8; 8; 2.5 [IU]/.5ML; [IU]/.5ML; UG/.5ML; UG/.5ML; UG/.5ML
0.5 SUSPENSION INTRAMUSCULAR ONCE
Refills: 0 | Status: DISCONTINUED | OUTPATIENT
Start: 2020-10-18 | End: 2020-10-19

## 2020-10-18 RX ORDER — ACETAMINOPHEN 500 MG
1000 TABLET ORAL ONCE
Refills: 0 | Status: DISCONTINUED | OUTPATIENT
Start: 2020-10-18 | End: 2020-10-19

## 2020-10-18 RX ORDER — MAGNESIUM HYDROXIDE 400 MG/1
30 TABLET, CHEWABLE ORAL
Refills: 0 | Status: DISCONTINUED | OUTPATIENT
Start: 2020-10-18 | End: 2020-10-19

## 2020-10-18 RX ORDER — LANOLIN
1 OINTMENT (GRAM) TOPICAL EVERY 6 HOURS
Refills: 0 | Status: DISCONTINUED | OUTPATIENT
Start: 2020-10-18 | End: 2020-10-19

## 2020-10-18 RX ORDER — SODIUM CHLORIDE 9 MG/ML
3 INJECTION INTRAMUSCULAR; INTRAVENOUS; SUBCUTANEOUS EVERY 8 HOURS
Refills: 0 | Status: DISCONTINUED | OUTPATIENT
Start: 2020-10-18 | End: 2020-10-19

## 2020-10-18 RX ORDER — IBUPROFEN 200 MG
600 TABLET ORAL EVERY 6 HOURS
Refills: 0 | Status: COMPLETED | OUTPATIENT
Start: 2020-10-18 | End: 2021-09-16

## 2020-10-18 RX ORDER — ACETAMINOPHEN 500 MG
975 TABLET ORAL
Refills: 0 | Status: DISCONTINUED | OUTPATIENT
Start: 2020-10-18 | End: 2020-10-19

## 2020-10-18 RX ORDER — OXYTOCIN 10 UNIT/ML
333.33 VIAL (ML) INJECTION
Qty: 20 | Refills: 0 | Status: DISCONTINUED | OUTPATIENT
Start: 2020-10-18 | End: 2020-10-19

## 2020-10-18 RX ORDER — SODIUM CHLORIDE 9 MG/ML
1000 INJECTION, SOLUTION INTRAVENOUS
Refills: 0 | Status: DISCONTINUED | OUTPATIENT
Start: 2020-10-18 | End: 2020-10-18

## 2020-10-18 RX ORDER — DIPHENHYDRAMINE HCL 50 MG
25 CAPSULE ORAL EVERY 6 HOURS
Refills: 0 | Status: DISCONTINUED | OUTPATIENT
Start: 2020-10-18 | End: 2020-10-19

## 2020-10-18 RX ORDER — KETOROLAC TROMETHAMINE 30 MG/ML
30 SYRINGE (ML) INJECTION ONCE
Refills: 0 | Status: DISCONTINUED | OUTPATIENT
Start: 2020-10-18 | End: 2020-10-19

## 2020-10-18 RX ORDER — DIBUCAINE 1 %
1 OINTMENT (GRAM) RECTAL EVERY 6 HOURS
Refills: 0 | Status: DISCONTINUED | OUTPATIENT
Start: 2020-10-18 | End: 2020-10-19

## 2020-10-18 RX ORDER — SIMETHICONE 80 MG/1
80 TABLET, CHEWABLE ORAL EVERY 4 HOURS
Refills: 0 | Status: DISCONTINUED | OUTPATIENT
Start: 2020-10-18 | End: 2020-10-19

## 2020-10-18 RX ORDER — HYDROCORTISONE 1 %
1 OINTMENT (GRAM) TOPICAL EVERY 6 HOURS
Refills: 0 | Status: DISCONTINUED | OUTPATIENT
Start: 2020-10-18 | End: 2020-10-19

## 2020-10-18 RX ORDER — OXYCODONE HYDROCHLORIDE 5 MG/1
5 TABLET ORAL ONCE
Refills: 0 | Status: DISCONTINUED | OUTPATIENT
Start: 2020-10-18 | End: 2020-10-19

## 2020-10-18 RX ORDER — CITRIC ACID/SODIUM CITRATE 300-500 MG
15 SOLUTION, ORAL ORAL EVERY 6 HOURS
Refills: 0 | Status: DISCONTINUED | OUTPATIENT
Start: 2020-10-18 | End: 2020-10-18

## 2020-10-18 RX ORDER — OXYCODONE HYDROCHLORIDE 5 MG/1
5 TABLET ORAL
Refills: 0 | Status: DISCONTINUED | OUTPATIENT
Start: 2020-10-18 | End: 2020-10-19

## 2020-10-18 RX ORDER — OXYTOCIN 10 UNIT/ML
333.33 VIAL (ML) INJECTION
Qty: 20 | Refills: 0 | Status: DISCONTINUED | OUTPATIENT
Start: 2020-10-18 | End: 2020-10-18

## 2020-10-18 RX ORDER — AER TRAVELER 0.5 G/1
1 SOLUTION RECTAL; TOPICAL EVERY 4 HOURS
Refills: 0 | Status: DISCONTINUED | OUTPATIENT
Start: 2020-10-18 | End: 2020-10-19

## 2020-10-18 RX ADMIN — Medication 975 MILLIGRAM(S): at 18:37

## 2020-10-18 RX ADMIN — Medication 975 MILLIGRAM(S): at 13:02

## 2020-10-18 RX ADMIN — Medication 975 MILLIGRAM(S): at 07:27

## 2020-10-18 RX ADMIN — TRANEXAMIC ACID 220 MILLIGRAM(S): 100 INJECTION, SOLUTION INTRAVENOUS at 03:32

## 2020-10-18 RX ADMIN — Medication 0.2 MILLIGRAM(S): at 03:27

## 2020-10-18 RX ADMIN — Medication 1 TABLET(S): at 13:02

## 2020-10-18 RX ADMIN — Medication 975 MILLIGRAM(S): at 13:35

## 2020-10-18 NOTE — CHART NOTE - NSCHARTNOTEFT_GEN_A_CORE
R3 Chart Note    33 yo  s/p , EBL 500cc. Pt s/p Methergine IM, TXA, Cytotec OR for atony. Pt has h/o FAVD in 2018 c/b PPH, requiring 2upRBC. Patient currently recovering well with lochia wnl on pad. Uterus firm and midline. She denies lightheadedness, dizziness, CP, SOB, palpitations, n/v. Voiding spontaneously.     Vital Signs Last 24 Hrs  T(C): 36.7 (18 Oct 2020 05:40), Max: 36.8 (18 Oct 2020 03:40)  T(F): 98.1 (18 Oct 2020 05:40), Max: 98.2 (18 Oct 2020 03:40)  HR: 86 (18 Oct 2020 05:40) (84 - 106)  BP: 126/67 (18 Oct 2020 05:40) (114/64 - 135/70)  BP(mean): 90 (18 Oct 2020 05:40) (83 - 90)  RR: 18 (18 Oct 2020 05:40) (17 - 18)  SpO2: 95% (18 Oct 2020 05:40) (95% - 100%)    Patient appropriate for transfer to postpartum     [facility] PGY3

## 2020-10-18 NOTE — PRE-ANESTHESIA EVALUATION ADULT - ANESTHESIA, PREVIOUS REACTION, PROFILE
none/Denies any family history of Malignant hyperthermia Spine appears normal, range of motion is not limited, no muscle or joint tenderness

## 2020-10-19 ENCOUNTER — TRANSCRIPTION ENCOUNTER (OUTPATIENT)
Age: 35
End: 2020-10-19

## 2020-10-19 VITALS
RESPIRATION RATE: 18 BRPM | HEART RATE: 80 BPM | SYSTOLIC BLOOD PRESSURE: 103 MMHG | OXYGEN SATURATION: 96 % | TEMPERATURE: 98 F | DIASTOLIC BLOOD PRESSURE: 70 MMHG

## 2020-10-19 LAB
KLEIHAUER-BETKE CALCULATION: 0 % — SIGNIFICANT CHANGE UP (ref 0–0.3)
SARS-COV-2 IGG SERPL QL IA: NEGATIVE — SIGNIFICANT CHANGE UP
SARS-COV-2 IGM SERPL IA-ACNC: <0.1 INDEX — SIGNIFICANT CHANGE UP
T PALLIDUM AB TITR SER: NEGATIVE — SIGNIFICANT CHANGE UP

## 2020-10-19 PROCEDURE — 85460 HEMOGLOBIN FETAL: CPT

## 2020-10-19 PROCEDURE — U0003: CPT

## 2020-10-19 PROCEDURE — 86901 BLOOD TYPING SEROLOGIC RH(D): CPT

## 2020-10-19 PROCEDURE — 86850 RBC ANTIBODY SCREEN: CPT

## 2020-10-19 PROCEDURE — 86780 TREPONEMA PALLIDUM: CPT

## 2020-10-19 PROCEDURE — 59025 FETAL NON-STRESS TEST: CPT

## 2020-10-19 PROCEDURE — 86769 SARS-COV-2 COVID-19 ANTIBODY: CPT

## 2020-10-19 PROCEDURE — 85025 COMPLETE CBC W/AUTO DIFF WBC: CPT

## 2020-10-19 PROCEDURE — 59050 FETAL MONITOR W/REPORT: CPT

## 2020-10-19 PROCEDURE — G0463: CPT

## 2020-10-19 PROCEDURE — 86900 BLOOD TYPING SEROLOGIC ABO: CPT

## 2020-10-19 RX ORDER — IBUPROFEN 200 MG
600 TABLET ORAL EVERY 6 HOURS
Refills: 0 | Status: DISCONTINUED | OUTPATIENT
Start: 2020-10-19 | End: 2020-10-19

## 2020-10-19 RX ORDER — IBUPROFEN 200 MG
1 TABLET ORAL
Qty: 0 | Refills: 0 | DISCHARGE
Start: 2020-10-19

## 2020-10-19 RX ORDER — ACETAMINOPHEN 500 MG
3 TABLET ORAL
Qty: 0 | Refills: 0 | DISCHARGE
Start: 2020-10-19

## 2020-10-19 RX ADMIN — Medication 600 MILLIGRAM(S): at 10:00

## 2020-10-19 RX ADMIN — Medication 975 MILLIGRAM(S): at 12:27

## 2020-10-19 RX ADMIN — Medication 975 MILLIGRAM(S): at 06:21

## 2020-10-19 RX ADMIN — Medication 975 MILLIGRAM(S): at 00:50

## 2020-10-19 RX ADMIN — Medication 600 MILLIGRAM(S): at 09:29

## 2020-10-19 RX ADMIN — SODIUM CHLORIDE 3 MILLILITER(S): 9 INJECTION INTRAMUSCULAR; INTRAVENOUS; SUBCUTANEOUS at 00:18

## 2020-10-19 RX ADMIN — Medication 1 TABLET(S): at 12:27

## 2020-10-19 RX ADMIN — SODIUM CHLORIDE 3 MILLILITER(S): 9 INJECTION INTRAMUSCULAR; INTRAVENOUS; SUBCUTANEOUS at 06:21

## 2020-10-19 RX ADMIN — Medication 975 MILLIGRAM(S): at 00:19

## 2020-10-19 RX ADMIN — Medication 975 MILLIGRAM(S): at 12:54

## 2020-10-19 NOTE — DISCHARGE NOTE OB - ADDITIONAL INSTRUCTIONS
f/u in 6 weeks f/u in 6 weeks  Nothing per vagina: no douching, intercourse, or tampons.  Continue to feed baby every 3-4 hours. Wake baby during night to feed.  Call doctor sooner if having: severe headaches, blurred vision, nausea, vomiting or shortness of breath or any questions or concerns.

## 2020-10-19 NOTE — DISCHARGE NOTE OB - MATERIALS PROVIDED
Shaken Baby Prevention Handout/Birth Certificate Instructions/Breastfeeding Log/Breastfeeding Mother’s Support Group Information/Guide to Postpartum Care/Vaccinations

## 2020-10-19 NOTE — DISCHARGE NOTE OB - MEDICATION SUMMARY - MEDICATIONS TO STOP TAKING
I will STOP taking the medications listed below when I get home from the hospital:    Keflex 500 mg oral capsule  -- 1 cap(s) by mouth 2 times a day  -- Finish all this medication unless otherwise directed by prescriber.    acetaminophen-oxyCODONE 325 mg-5 mg oral tablet  -- 1 tab(s) by mouth 4 to 6 times a day MDD:8  -- Caution federal law prohibits the transfer of this drug to any person other  than the person for whom it was prescribed.  May cause drowsiness.  Alcohol may intensify this effect.  Use care when operating dangerous machinery.  This prescription cannot be refilled.  This product contains acetaminophen.  Do not use  with any other product containing acetaminophen to prevent possible liver damage.  Using more of this medication than prescribed may cause serious breathing problems.

## 2020-10-19 NOTE — PROGRESS NOTE ADULT - SUBJECTIVE AND OBJECTIVE BOX
OB Progress Note:  PPD#1    S: 36yo  PPD#1 s/p  c/b  s/p IM methergine. Patient feels well. Pain is well controlled, tolerating regular diet, passing flatus, voiding spontaneously, ambulating without difficulty. Denies heavy vaginal bleeding, CP/SOB, N/V, lightheadedness/dizziness.     O:  Vitals:  Vital Signs Last 24 Hrs  T(C): 36.8 (18 Oct 2020 20:38), Max: 37.1 (18 Oct 2020 13:31)  T(F): 98.2 (18 Oct 2020 20:38), Max: 98.8 (18 Oct 2020 13:31)  HR: 79 (18 Oct 2020 20:38) (79 - 98)  BP: 107/73 (18 Oct 2020 20:38) (107/58 - 128/62)  BP(mean): 90 (18 Oct 2020 05:40) (83 - 90)  RR: 18 (18 Oct 2020 20:38) (17 - 18)  SpO2: 96% (18 Oct 2020 20:38) (95% - 98%)    MEDICATIONS  (STANDING):  acetaminophen   Tablet .. 975 milliGRAM(s) Oral <User Schedule>  acetaminophen  IVPB .. 1000 milliGRAM(s) IV Intermittent once  diphtheria/tetanus/pertussis (acellular) Vaccine (ADAcel) 0.5 milliLiter(s) IntraMuscular once  ibuprofen  Tablet. 600 milliGRAM(s) Oral every 6 hours  ketorolac   Injectable 30 milliGRAM(s) IV Push once  oxytocin Infusion 333.333 milliUNIT(s)/Min (1000 mL/Hr) IV Continuous <Continuous>  prenatal multivitamin 1 Tablet(s) Oral daily  sodium chloride 0.9% lock flush 3 milliLiter(s) IV Push every 8 hours      Labs:  Blood type: O Negative  Rubella IgG: RPR: Negative                          13.5   14.81<H> >-----------< 248    ( 10-18 @ 01:13 )             42.0                  Physical Exam:  General: NAD  Abdomen: soft, non-tender, non-distended, fundus firm  Vaginal: No heavy vaginal bleeding  Extremities: No erythema/edema   OB Progress Note:  PPD#2    S: 34yo  PPD#2 s/p  c/b  s/p IM methergine. Patient feels well. Pain is well controlled, tolerating regular diet, passing flatus, voiding spontaneously, ambulating without difficulty. Denies heavy vaginal bleeding, CP/SOB, N/V, lightheadedness/dizziness.     O:  Vitals:  Vital Signs Last 24 Hrs  T(C): 36.8 (18 Oct 2020 20:38), Max: 37.1 (18 Oct 2020 13:31)  T(F): 98.2 (18 Oct 2020 20:38), Max: 98.8 (18 Oct 2020 13:31)  HR: 79 (18 Oct 2020 20:38) (79 - 98)  BP: 107/73 (18 Oct 2020 20:38) (107/58 - 128/62)  BP(mean): 90 (18 Oct 2020 05:40) (83 - 90)  RR: 18 (18 Oct 2020 20:38) (17 - 18)  SpO2: 96% (18 Oct 2020 20:38) (95% - 98%)    MEDICATIONS  (STANDING):  acetaminophen   Tablet .. 975 milliGRAM(s) Oral <User Schedule>  acetaminophen  IVPB .. 1000 milliGRAM(s) IV Intermittent once  diphtheria/tetanus/pertussis (acellular) Vaccine (ADAcel) 0.5 milliLiter(s) IntraMuscular once  ibuprofen  Tablet. 600 milliGRAM(s) Oral every 6 hours  ketorolac   Injectable 30 milliGRAM(s) IV Push once  oxytocin Infusion 333.333 milliUNIT(s)/Min (1000 mL/Hr) IV Continuous <Continuous>  prenatal multivitamin 1 Tablet(s) Oral daily  sodium chloride 0.9% lock flush 3 milliLiter(s) IV Push every 8 hours      Labs:  Blood type: O Negative  Rubella IgG: RPR: Negative                          13.5   14.81<H> >-----------< 248    ( 10-18 @ 01:13 )             42.0                  Physical Exam:  General: NAD  Abdomen: soft, non-tender, non-distended, fundus firm  Vaginal: No heavy vaginal bleeding  Extremities: No erythema/edema   OB Progress Note:  PPD#1    S: 34yo  PPD#1 s/p  c/b  s/p IM methergine. Patient feels well. Pain is well controlled, tolerating regular diet, passing flatus, voiding spontaneously, ambulating without difficulty. Denies heavy vaginal bleeding, CP/SOB, N/V, lightheadedness/dizziness.     O:  Vitals:  Vital Signs Last 24 Hrs  T(C): 36.8 (18 Oct 2020 20:38), Max: 37.1 (18 Oct 2020 13:31)  T(F): 98.2 (18 Oct 2020 20:38), Max: 98.8 (18 Oct 2020 13:31)  HR: 79 (18 Oct 2020 20:38) (79 - 98)  BP: 107/73 (18 Oct 2020 20:38) (107/58 - 128/62)  BP(mean): 90 (18 Oct 2020 05:40) (83 - 90)  RR: 18 (18 Oct 2020 20:38) (17 - 18)  SpO2: 96% (18 Oct 2020 20:38) (95% - 98%)    MEDICATIONS  (STANDING):  acetaminophen   Tablet .. 975 milliGRAM(s) Oral <User Schedule>  acetaminophen  IVPB .. 1000 milliGRAM(s) IV Intermittent once  diphtheria/tetanus/pertussis (acellular) Vaccine (ADAcel) 0.5 milliLiter(s) IntraMuscular once  ibuprofen  Tablet. 600 milliGRAM(s) Oral every 6 hours  ketorolac   Injectable 30 milliGRAM(s) IV Push once  oxytocin Infusion 333.333 milliUNIT(s)/Min (1000 mL/Hr) IV Continuous <Continuous>  prenatal multivitamin 1 Tablet(s) Oral daily  sodium chloride 0.9% lock flush 3 milliLiter(s) IV Push every 8 hours      Labs:  Blood type: O Negative  Rubella IgG: RPR: Negative                          13.5   14.81<H> >-----------< 248    ( 10-18 @ 01:13 )             42.0                  Physical Exam:  General: NAD  Abdomen: soft, non-tender, non-distended, fundus firm  Vaginal: No heavy vaginal bleeding  Extremities: No erythema/edema

## 2020-10-19 NOTE — DISCHARGE NOTE OB - MEDICATION SUMMARY - MEDICATIONS TO TAKE
I will START or STAY ON the medications listed below when I get home from the hospital:    ibuprofen 600 mg oral tablet  -- 1 tab(s) by mouth every 6 hours  -- Indication: For pain    acetaminophen 325 mg oral tablet  -- 3 tab(s) by mouth   -- Indication: For pain    Prenatal Multivitamins with Folic Acid 1 mg oral tablet  -- 1 tab(s) by mouth once a day  -- Indication: For vitamin

## 2020-10-19 NOTE — DISCHARGE NOTE OB - CARE PLAN
Principal Discharge DX:	 (normal spontaneous vaginal delivery)  Goal:	recovery  Assessment and plan of treatment:	reg diet ,ambulating, pain control

## 2020-10-19 NOTE — PROGRESS NOTE ADULT - PROBLEM SELECTOR PLAN 1
- Pain well controlled, continue current pain regimen  - Increase ambulation, SCDs when not ambulating  - Continue regular diet    Porfirio Scott, PGY1

## 2020-10-19 NOTE — DISCHARGE NOTE OB - CARE PROVIDER_API CALL
Renea Mathur  OBSTETRICS AND GYNECOLOGY  [address] [place] [address], [address]  [place] [address]  Phone: [phone]  Fax: [phone]  Follow Up Time:

## 2020-10-19 NOTE — DISCHARGE NOTE OB - PATIENT PORTAL LINK FT
You can access the FollowMyHealth Patient Portal offered by [facility] by registering at the following website: [email or web address]. By joining [facility]’s FollowMyHealth portal, you will also be able to view your health information using other applications (apps) compatible with our system.

## 2020-10-19 NOTE — PROGRESS NOTE ADULT - ASSESSMENT
A/P: 36yo PPD#1 s/p  c/b  s/p IM methergine.  Patient is stable and doing well post-partum.    A/P: 34yo PPD#2 s/p  c/b  s/p IM methergine.  Patient is stable and doing well post-partum.    A/P: 36yo PPD#1 s/p  c/b  s/p IM methergine.  Patient is stable and doing well post-partum.

## 2021-05-12 ENCOUNTER — APPOINTMENT (OUTPATIENT)
Dept: MAMMOGRAPHY | Facility: CLINIC | Age: 36
End: 2021-05-12
Payer: COMMERCIAL

## 2021-05-12 ENCOUNTER — OUTPATIENT (OUTPATIENT)
Dept: OUTPATIENT SERVICES | Facility: HOSPITAL | Age: 36
LOS: 1 days | End: 2021-05-12
Payer: COMMERCIAL

## 2021-05-12 DIAGNOSIS — J34.2 DEVIATED NASAL SEPTUM: Chronic | ICD-10-CM

## 2021-05-12 DIAGNOSIS — Z12.31 ENCOUNTER FOR SCREENING MAMMOGRAM FOR MALIGNANT NEOPLASM OF BREAST: ICD-10-CM

## 2021-05-12 DIAGNOSIS — Z00.8 ENCOUNTER FOR OTHER GENERAL EXAMINATION: ICD-10-CM

## 2021-05-12 PROCEDURE — 77067 SCR MAMMO BI INCL CAD: CPT

## 2021-05-12 PROCEDURE — 77063 BREAST TOMOSYNTHESIS BI: CPT | Mod: 26

## 2021-05-12 PROCEDURE — 77067 SCR MAMMO BI INCL CAD: CPT | Mod: 26

## 2021-05-12 PROCEDURE — 77063 BREAST TOMOSYNTHESIS BI: CPT

## 2021-08-12 NOTE — H&P PST ADULT - ATTENDING PHYSICIAN: I HAVE REVIEWED THE CLINICAL DOCUMENTATION AND AGREE WITH THE ABOVE NOTE
[id number]    PROGRESS NOTE             8/12/2021    1:39 PM    Name:   Harvey Quezada  MRN:     [id number]     Acct:      [de-identified]   Room:   2099/2099-01   Day:  3  Admit Date:  8/8/2021 11:30 AM    PCP:  No primary care provider on file. Code Status:  Full Code    Subjective:     C/C: No chief complaint on file. Altered mental status     Interval History Status: not changed. Patient was seen and evaluated at the bedside. He was more awake then yesterday but unresponsive. Responsive to noxious stimuli in lower extremities. Blinking spontaneously. One time apneic episode noted during exam. Clinical support also noted in the morning and nurse overnight. Plan to start him on bipap with respiratory care and eval. Vitally stable. No fever. Awaiting decision from family. Palliative care on board. 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 Patient has past medical history of CVA, coronary artery disease s/p percutaneous coronary angioplasty, hyperlipidemia, hypertension, diabetes, emphysema/COPD.     History difficult to obtain as patient is somnolent and nonresponsive. Per prior documentation, patient had increasing lethargy on 8/6. CT head obtained which showed: There has been interval development of a hypodensity in the right basal ganglia compared to prior study consistent with evolving subacute white matter infarct. No hemorrhage is noted. Chronic microvascular change in atrophy is demonstrated.   No midline shift.      Spoke with family - sister and niece - who stated he is somewhat confused at baseline, but has become significantly more lethargic/began to be agitated and combative about 1.5 weeks ago while in the acute rehab unit.     He was transferred to medicine service for management of altered mental status. For a few days prior to transfer, patient has had intermittent elevations in temperature, up to 100 °F  Patient has been coughing intermittently; frothy/brown substance suctioned from mouth    Pneumonia/sepsis work-up: CXR no acute process, procalcitonin 0.26, lactic acid 2.0, legionella/strep/mycoplasma negative,  blood culture no growth to date, urinalysis negative  Metabolic encephalopathy workup: LFTs elevated, ammonia wnl, myoglobin elevated, CK wnl. Review of Systems:     Review of Systems   Unable to perform ROS: Mental status change (patient lethargic and not responsive)   Constitutional: Negative for diaphoresis. Medications:      Allergies:  No Known Allergies    Current Meds:   Scheduled Meds:    heparin (porcine)  5,000 Units Subcutaneous 3 times per day    ticagrelor  90 mg Oral BID    nystatin  5 mL Oral 4x Daily    aspirin  81 mg PEG Tube Daily    famotidine  20 mg PEG Tube Daily    FLUoxetine  40 mg PEG Tube Daily    [Held by provider] hydrALAZINE  10 mg Oral 3 times per day    insulin lispro  0-6 Units Subcutaneous 4 times per day    [Held by provider] isosorbide dinitrate  10 mg PEG Tube TID    [Held by provider] losartan  50 mg Oral Daily    melatonin  6 mg PEG Tube Nightly    OLANZapine  5 mg PEG Tube Nightly    rosuvastatin  40 mg PEG Tube Nightly    tiotropium  2 puff Inhalation Lunch    ampicillin-sulbactam  1,500 mg Intravenous Q6H     Continuous Infusions:    dextrose       PRN Meds: albuterol, hydrALAZINE, acetaminophen, bisacodyl, polyethylene glycol, senna, albuterol sulfate HFA, dextrose, dextrose, glucagon (rDNA), glucose    Data:     Past Medical History:   has a past medical history of Centrilobular emphysema (HCC), COPD (chronic obstructive pulmonary disease) ([address] Depression, Diabetes mellitus ([address] Former smoker, Hyperlipidemia, Hypertension, Mixed restrictive and obstructive lung disease ([address] Need for pneumococcal vaccine, and Personal history of tobacco use. Social History:   reports that he quit smoking about 8 years ago. He started smoking about 64 years ago. He has a 42.00 pack-year smoking history. He has never used smokeless tobacco. He reports that he does not drink alcohol and does not use drugs. Family History: No family history on file. Vitals:  /80   Pulse 83   Temp 98.5 °F (36.9 °C) (Axillary)   Resp 18   Ht 5' 10\" (1.778 m)   Wt 212 lb 11.9 oz (96.5 kg)   SpO2 100%   BMI 30.53 kg/m²   Temp (24hrs), Av.8 °F (37.1 °C), Min:98.2 °F (36.8 °C), Max:99.5 °F (37.5 °C)    Recent Labs     21  1239 21  1803 21  0018 21  0607   POCGLU 148* 168* 158* 214*       I/O(24Hr):     Intake/Output Summary (Last 24 hours) at 2021 1339  Last data filed at 2021 1333  Gross per 24 hour   Intake 2616.11 ml   Output 975 ml   Net 1641.11 ml       Labs:    CBC:   Lab Results   Component Value Date    WBC 9.1 2021    RBC 4.41 2021    RBC 4.88 2012    HGB 10.7 2021    HCT 34.6 2021    MCV 78.4 2021    MCH 24.1 2021    MCHC 30.8 2021    RDW 17.3 2021     2021     2012    MPV 10.7 2021     BMP:    Lab Results   Component Value Date     2021    K 4.4 2021     2021    CO2 24 2021    BUN 25 2021    LABALBU 2.9 2021    CREATININE 1.54 2021    CALCIUM 8.6 2021    GFRAA 54 2021    LABGLOM 44 2021    GLUCOSE 195 2021    GLUCOSE 122 2012       Lab Results   Component Value Date/Time    SPECIAL NOT REPORTED 2021 02:38 PM     Lab Results   Component Value Date/Time    CULTURE NO GROWTH 4 DAYS 2021 02:38 PM         Radiology:      CT HEAD WO CONTRAST    Result Date: 2021  EXAMINATION: CT OF THE HEAD WITHOUT CONTRAST  2021 2:04 pm TECHNIQUE: CT of the head was performed without the TECHNOLOGIST PROVIDED HISTORY: Patient with history of left-sided CVA with right hemiparesis, not communicating with staff, please evaluate for any change from previous imaging and any other abnormality Reason for Exam: Patient with history of left-sided CVA with right hemiparesis, not communicating with staff, please evaluate for any change from previous imaging and any other abnormality Acuity: Unknown Type of Exam: Unknown FINDINGS: BRAIN/VENTRICLES: There is no acute intracranial hemorrhage, mass effect or midline shift. No abnormal extra-axial fluid collection. The gray-white differentiation is maintained without evidence of an acute infarct. There is prominence of the ventricles and sulci due to global parenchymal volume loss. There are nonspecific areas of hypoattenuation within the periventricular and subcortical white matter, which likely represent chronic microvascular ischemic change. Remote right external capsule and anterior lateral right basal ganglia stroke. Remote lacunar stroke left caudate lobe. ORBITS: The visualized portion of the orbits demonstrate no acute abnormality. SINUSES: The visualized paranasal sinuses and mastoid air cells demonstrate no acute abnormality. SOFT TISSUES/SKULL: No acute abnormality of the visualized skull or soft tissues. 1. No acute intracranial abnormality. 2. Remote right external capsule and anterior lateral right basal ganglia stroke. Remote lacunar stroke left caudate lobe. 3. Senescent changes. White matter hypoattenuation described is typical of microvascular ischemic disease or as sequela of dysmyelinating/demyelinating processes.        MRA HEAD WO CONTRAST    Result Date: 8/7/2021  EXAMINATION: MRA OF THE NECK WITHOUT CONTRAST; MRI OF THE BRAIN WITHOUT CONTRAST; MRA OF THE HEAD WITHOUT CONTRAST 8/7/2021 8:28 pm; 8/7/2021 8:34 pm; 8/7/2021 8:27 pm: TECHNIQUE: Multiplanar multisequence MRA of the neck was performed without the administration of intravenous contrast. Stenosis of the internal carotid arteries measured using NASCET criteria.; Multiplanar multisequence MRI of the brain was performed without the administration of intravenous contrast.; MRA of the head was performed utilizing time-of-flight imaging with MIP images. No intravenous contrast was administered. COMPARISON: None. HISTORY: ORDERING SYSTEM PROVIDED HISTORY: stroke TECHNOLOGIST PROVIDED HISTORY: stroke Reason for Exam: stroke Additional signs and symptoms: patient unable to give history and unable to follow exam instructions due to mental status FINDINGS: MRI BRAIN: INTRACRANIAL STRUCTURES/VENTRICLES: Multifocal diffusion restriction abnormality related to acute ischemia are seen involving the right basal ganglia within the putamen, caudate nucleus and anterior limb of the right internal capsule, the posterior limb of the left internal capsule and lateral margin of the left thalamus, the posterior commissure of the corpus callosum, and left greater than right frontal parietal paramedian subcortical and deep white matter . Can fluent increased T2/FLAIR signal is noted within the cerebral white matter consistent with severe microvascular ischemic change. No mass effect or midline shift. No evidence of an acute intracranial hemorrhage. Mild diffuse decrease in cerebral volume is noted with corresponding prominence of the sulci and ventricles. The sellar/suprasellar regions appear unremarkable. The normal signal voids within the major intracranial vessels appear maintained. ORBITS: The visualized portion of the orbits demonstrate no acute abnormality. SINUSES: The visualized paranasal sinuses and mastoid air cells are well aerated. BONES/SOFT TISSUES: The bone marrow signal intensity appears normal. The soft tissues demonstrate no acute abnormality. MRA NECK: AORTIC ARCH/ARCH VESSELS: No dissection or arterial injury.   No significant stenosis of the brachiocephalic or subclavian arteries. CAROTID ARTERIES: No dissection, arterial injury, or hemodynamically significant stenosis by NASCET criteria. VERTEBRAL ARTERIES: No dissection, arterial injury, or significant stenosis. MRA HEAD: ANTERIOR CIRCULATION: Suspected focal high-grade stenosis involving the A1 segment of the left anterior cerebral artery is identified. Right middle cerebral artery focal moderate grade stenosis involving the M1 segment is present. Suspected multifocal moderate to high-grade stenosis involving the right middle cerebral artery M2 segment is seen. No further evidence of significant stenosis of the intracranial internal carotid, anterior cerebral, or middle cerebral arteries. POSTERIOR CIRCULATION: Bilateral posterior cerebral artery P2 segment suspected focal moderate to high-grade stenosis are noted. No further evidence of significant stenosis of the vertebral, basilar, or posterior cerebral arteries. 1. Multifocal acute ischemia involving the bilateral basal ganglia, as discussed above, posterior commissure of the corpus callosum, lateral margin of left thalamus and the left greater than right frontal parietal paramedian subcortical and deep white matter. 2. No evidence of associated hemorrhagic conversion. 3. Finding suggestive of embolic phenomenon. Recommend clinical correlation. 4. Suspected focal high-grade stenosis involving A1 segment of the left anterior cerebral artery. 5. Suspected right middle cerebral artery M1 segment focal moderate grade stenosis. 6. Suspected right middle cerebral artery M2 segment multifocal moderate to high-grade stenosis. 7. Bilateral posterior cerebral artery P2 segment suspected multifocal moderate to high-grade stenosis. 8. Grossly unremarkable MR angiogram of the neck. Note: MR angiographic evaluation of the neck is severely limited by patient motion related artifact. 9. Severe cerebral white matter chronic microvascular ischemic disease.  10. Mild diffuse cerebral atrophy. Critical results were called by Dr. Kathy Weaver to Dr. Savana Isbell On 8/7/2021 at 22:11. XR CHEST PORTABLE    Result Date: 8/8/2021  EXAMINATION: ONE XRAY VIEW OF THE CHEST 8/8/2021 11:58 am COMPARISON: 07/13/2021 HISTORY: ORDERING SYSTEM PROVIDED HISTORY: cough and fever TECHNOLOGIST PROVIDED HISTORY: cough and fever Reason for Exam: cough and fever Acuity: Acute Type of Exam: Initial FINDINGS: No lung infiltrate or consolidation. No pneumothorax or pleural effusion. Heart size is normal.     No acute abnormality. VL Lower Extremity Bilateral Venous Duplex    Result Date: 8/9/2021    [facility]  Vascular Lower Extremities DVT Study Procedure   Patient Name   GOFF       Date of Study           08/09/2021                 YAYA DUBOIS   Date of Birth  1946   Gender                  Male   Age            76 year(s)   Race                    Black   Room Number    2099   Corporate ID # [id number]   Patient Acct # [de-identified]   MR #           [id number]       Sonographer             Canelo Marie   Accession #    [id number]   Interpreting Physician  Sharmaine Prieto   Referring      Nick Tamez,   Referring Physician  Nurse          APRN-CNP  Practitioner  Procedure Type of Study:   Veins: Lower Extremities DVT Study, Venous Scan Lower Bilateral.  Indications for Study:R/O DVT. Patient Status: In Patient. Technical Quality:Adequate visualization. Conclusions   Summary   No evidence of superficial or deep venous thrombosis in both lower  extremities.    Signature   ----------------------------------------------------------------  Electronically signed by Canelo Marie(Sonographer) on  08/09/2021 07:56 AM  ----------------------------------------------------------------   ----------------------------------------------------------------  Electronically signed by Delroy Annas Reyes,Arthur([facility]  physician) on 08/09/2021 11:02 PM  ---------------------------------------------------------------- Findings:   Right Impression:                    Left Impression:   The common femoral, femoral,         The common femoral, femoral,  popliteal and tibial veins           popliteal and tibial veins  demonstrate normal compressibility   demonstrate normal compressibility  and augmentation. and augmentation. Normal compressibility of the great  Normal compressibility of the great  saphenous vein. saphenous vein. Normal compressibility of the small  Normal compressibility of the small  saphenous vein. saphenous vein. Velocities are measured in cm/s ; Diameters are measured in cm Right Lower Extremities DVT Study Measurements Right 2D Measurements +------------------------------------+----------+---------------+----------+ ! Location                            ! Visualized! Compressibility! Thrombosis! +------------------------------------+----------+---------------+----------+ ! Common Femoral                      !Yes       ! Yes            ! None      ! +------------------------------------+----------+---------------+----------+ ! Prox Femoral                        !Yes       ! Yes            ! None      ! +------------------------------------+----------+---------------+----------+ ! Mid Femoral                         !Yes       ! Yes            ! None      ! +------------------------------------+----------+---------------+----------+ ! Dist Femoral                        !Yes       ! Yes            ! None      ! +------------------------------------+----------+---------------+----------+ ! Deep Femoral                        !No        !               !          ! +------------------------------------+----------+---------------+----------+ ! Popliteal                           !Yes       ! Yes            ! None      ! +------------------------------------+----------+---------------+----------+ ! Sapheno Femoral Junction            ! Yes       ! Yes            ! None ! +------------------------------------+----------+---------------+----------+ ! PTV                                 ! Yes       ! Yes            ! None      ! +------------------------------------+----------+---------------+----------+ ! Peroneal                            !Partial   !Yes            ! None      ! +------------------------------------+----------+---------------+----------+ ! Gastroc                             ! Yes       ! Yes            ! None      ! +------------------------------------+----------+---------------+----------+ ! GSV Thigh                           ! Yes       ! Yes            ! None      ! +------------------------------------+----------+---------------+----------+ ! GSV Knee                            ! Yes       ! Yes            ! None      ! +------------------------------------+----------+---------------+----------+ ! GSV Ankle                           ! Yes       ! Yes            ! None      ! +------------------------------------+----------+---------------+----------+ ! SSV                                 ! Partial   !Yes            ! None      ! +------------------------------------+----------+---------------+----------+ Right Doppler Measurements +---------------------------+------+------+--------------------------------+ ! Location                   ! Signal!Reflux! Reflux (msec)                   ! +---------------------------+------+------+--------------------------------+ ! Common Femoral             !Phasic!      !                                ! +---------------------------+------+------+--------------------------------+ ! Prox Femoral               !Phasic!      !                                ! +---------------------------+------+------+--------------------------------+ ! Popliteal                  !Phasic!      !                                ! +---------------------------+------+------+--------------------------------+ Left Lower Extremities DVT Study Measurements Left 2D Measurements +------------------------------------+----------+---------------+----------+ ! Location                            ! Visualized! Compressibility! Thrombosis! +------------------------------------+----------+---------------+----------+ ! Common Femoral                      !Yes       ! Yes            ! None      ! +------------------------------------+----------+---------------+----------+ ! Prox Femoral                        !Yes       ! Yes            ! None      ! +------------------------------------+----------+---------------+----------+ ! Mid Femoral                         !Yes       ! Yes            ! None      ! +------------------------------------+----------+---------------+----------+ ! Dist Femoral                        !Yes       ! Yes            ! None      ! +------------------------------------+----------+---------------+----------+ ! Deep Femoral                        !No        !               !          ! +------------------------------------+----------+---------------+----------+ ! Popliteal                           !Yes       ! Yes            ! None      ! +------------------------------------+----------+---------------+----------+ ! Sapheno Femoral Junction            ! Yes       ! Yes            ! None      ! +------------------------------------+----------+---------------+----------+ ! PTV                                 ! Yes       ! Yes            ! None      ! +------------------------------------+----------+---------------+----------+ ! Peroneal                            !Partial   !Yes            ! None      ! +------------------------------------+----------+---------------+----------+ ! Gastroc                             ! Yes       ! Yes            ! None      ! +------------------------------------+----------+---------------+----------+ ! GSV Thigh                           ! Yes       ! Yes            ! None      ! +------------------------------------+----------+---------------+----------+ ! GSV Knee !Yes       !Yes            ! None      ! +------------------------------------+----------+---------------+----------+ ! GSV Ankle                           ! Yes       ! Yes            ! None      ! +------------------------------------+----------+---------------+----------+ ! SSV                                 ! Yes       ! Yes            ! None      ! +------------------------------------+----------+---------------+----------+ Left Doppler Measurements +---------------------------+------+------+--------------------------------+ ! Location                   ! Signal!Reflux! Reflux (msec)                   ! +---------------------------+------+------+--------------------------------+ ! Common Femoral             !Phasic!      !                                ! +---------------------------+------+------+--------------------------------+ ! Prox Femoral               !Phasic!      !                                ! +---------------------------+------+------+--------------------------------+ ! Popliteal                  !Phasic!      !                                ! +---------------------------+------+------+--------------------------------+    MRA NECK WO CONTRAST    Result Date: 8/7/2021  EXAMINATION: MRA OF THE NECK WITHOUT CONTRAST; MRI OF THE BRAIN WITHOUT CONTRAST; MRA OF THE HEAD WITHOUT CONTRAST 8/7/2021 8:28 pm; 8/7/2021 8:34 pm; 8/7/2021 8:27 pm: TECHNIQUE: Multiplanar multisequence MRA of the neck was performed without the administration of intravenous contrast. Stenosis of the internal carotid arteries measured using NASCET criteria.; Multiplanar multisequence MRI of the brain was performed without the administration of intravenous contrast.; MRA of the head was performed utilizing time-of-flight imaging with MIP images. No intravenous contrast was administered. COMPARISON: None.  HISTORY: ORDERING SYSTEM PROVIDED HISTORY: stroke TECHNOLOGIST PROVIDED HISTORY: stroke Reason for Exam: stroke Additional signs and symptoms: patient unable to give history and unable to follow exam instructions due to mental status FINDINGS: MRI BRAIN: INTRACRANIAL STRUCTURES/VENTRICLES: Multifocal diffusion restriction abnormality related to acute ischemia are seen involving the right basal ganglia within the putamen, caudate nucleus and anterior limb of the right internal capsule, the posterior limb of the left internal capsule and lateral margin of the left thalamus, the posterior commissure of the corpus callosum, and left greater than right frontal parietal paramedian subcortical and deep white matter . Can fluent increased T2/FLAIR signal is noted within the cerebral white matter consistent with severe microvascular ischemic change. No mass effect or midline shift. No evidence of an acute intracranial hemorrhage. Mild diffuse decrease in cerebral volume is noted with corresponding prominence of the sulci and ventricles. The sellar/suprasellar regions appear unremarkable. The normal signal voids within the major intracranial vessels appear maintained. ORBITS: The visualized portion of the orbits demonstrate no acute abnormality. SINUSES: The visualized paranasal sinuses and mastoid air cells are well aerated. BONES/SOFT TISSUES: The bone marrow signal intensity appears normal. The soft tissues demonstrate no acute abnormality. MRA NECK: AORTIC ARCH/ARCH VESSELS: No dissection or arterial injury. No significant stenosis of the brachiocephalic or subclavian arteries. CAROTID ARTERIES: No dissection, arterial injury, or hemodynamically significant stenosis by NASCET criteria. VERTEBRAL ARTERIES: No dissection, arterial injury, or significant stenosis. MRA HEAD: ANTERIOR CIRCULATION: Suspected focal high-grade stenosis involving the A1 segment of the left anterior cerebral artery is identified. Right middle cerebral artery focal moderate grade stenosis involving the M1 segment is present.   Suspected multifocal moderate to high-grade stenosis involving the right middle cerebral artery M2 segment is seen. No further evidence of significant stenosis of the intracranial internal carotid, anterior cerebral, or middle cerebral arteries. POSTERIOR CIRCULATION: Bilateral posterior cerebral artery P2 segment suspected focal moderate to high-grade stenosis are noted. No further evidence of significant stenosis of the vertebral, basilar, or posterior cerebral arteries. 1. Multifocal acute ischemia involving the bilateral basal ganglia, as discussed above, posterior commissure of the corpus callosum, lateral margin of left thalamus and the left greater than right frontal parietal paramedian subcortical and deep white matter. 2. No evidence of associated hemorrhagic conversion. 3. Finding suggestive of embolic phenomenon. Recommend clinical correlation. 4. Suspected focal high-grade stenosis involving A1 segment of the left anterior cerebral artery. 5. Suspected right middle cerebral artery M1 segment focal moderate grade stenosis. 6. Suspected right middle cerebral artery M2 segment multifocal moderate to high-grade stenosis. 7. Bilateral posterior cerebral artery P2 segment suspected multifocal moderate to high-grade stenosis. 8. Grossly unremarkable MR angiogram of the neck. Note: MR angiographic evaluation of the neck is severely limited by patient motion related artifact. 9. Severe cerebral white matter chronic microvascular ischemic disease. 10. Mild diffuse cerebral atrophy. Critical results were called by Dr. Kev Lira to Dr. Chema Neely On 8/7/2021 at 22:11.      MRI BRAIN WO CONTRAST    Result Date: 8/7/2021  EXAMINATION: MRA OF THE NECK WITHOUT CONTRAST; MRI OF THE BRAIN WITHOUT CONTRAST; MRA OF THE HEAD WITHOUT CONTRAST 8/7/2021 8:28 pm; 8/7/2021 8:34 pm; 8/7/2021 8:27 pm: TECHNIQUE: Multiplanar multisequence MRA of the neck was performed without the administration of intravenous contrast. Stenosis of the internal carotid to Dr. Joseph Jimenez On 8/7/2021 at 22:11. Physical Examination:        Physical Exam  Constitutional:       Appearance: He is obese. He is ill-appearing. Comments: Lethargic   HENT:      Head: Normocephalic and atraumatic. Nose: Nose normal.      Mouth/Throat:      Comments: Oral thrush noted on the tongue. Cardiovascular:      Rate and Rhythm: Normal rate and regular rhythm. Heart sounds: No murmur heard. No friction rub. No gallop. Pulmonary:      Effort: No respiratory distress. Breath sounds: Wheezing present. Abdominal:      General: Bowel sounds are normal. There is no distension. Palpations: Abdomen is soft. Musculoskeletal:      Right lower leg: No edema. Left lower leg: No edema. Comments: Spasticity of bilateral upper extremities, more so on the right  Normal passive range of motion of bilateral lower extremities   Skin:     Capillary Refill: Capillary refill takes 2 to 3 seconds. Neurological:      Mental Status: He is unresponsive. GCS: GCS eye subscore is 1. GCS verbal subscore is 1. GCS motor subscore is 4. Motor: Abnormal muscle tone present.       Comments: Lethargic, opens eyes to some tactile stimuli   Psychiatric:      Comments: Unable to evaluate           Assessment:        Primary Problem  Altered mental status    Active Hospital Problems    Diagnosis Date Noted    Oral candidiasis [B37.0] 08/12/2021    Altered mental status [R41.82] 08/09/2021    Severe malnutrition ([facility] [address] [E43] 08/09/2021    History of ITP [Z86.2]     Cerebral multi-infarct state [I69.30] 08/08/2021    Encephalopathy [G93.40] 08/07/2021       Plan:        ~Altered mental status, secondary to metabolic encephalopathy versus pneumonia/sepsis  -Patient high risk for aspiration due to history of multiple strokes and altered mental status  -Blood cultures x2 no growth so far  -LFTs elevated, ammonia level wnl  -CXR showed no acute pulmonary process, procalcitonin 0.26, lactic acid 2.0, mycoplasma, strep pneumo, legionella, urinalysis negative  -Treat empirically for aspiration pneumonia with IV Unasyn.      ~History of multiple CVA with acute multi-infarct   -MRI brain showed multiple new infarcts bilaterally  -Plan for IZABELLA and loop recorder procedure to r/o cardiac source of emboli: IZABELLA not performed due to patient being unresponsive.  -Neurology on board: awaiting recommendations. Off high dose heparin. Currently on asa only. -Heme-onc consulted for thrombocytopenia and recurrent stroke: Okay to give anticoagulation or antiplatelet platelet is above 27,270. Overall prognosis is poor. Avoid myelosuppressive drugs. Palliative care: Meeting with family today at [facility] as per nurse.      ~Electrolyte Disturbance  -potassium 4.4, sodium 139  -one time dose calcium gluconate 1,000mg  -12 lead EKG 8/9: Sinus bradycardia with sinus arrhythmia. -, Myoglobin 280     ~CELESTINO on CKD, likely due to poor intake vs rhabdomyolysis, improved  -Creatinine elevated, baseline (1.4-1.5)   -1.86 on 8/7, 1.45 this AM  -CK wnl, myoglobin elevated at 280  -Continue to monitor      ~Protein calorie malnutrition  -Albumin 2.9, total protein 6.1  -Patient receiving PEG tube feeds  -Dietary on board     ~Diabetes mellitus  -Low dose sliding scale insulin  -Continue to monitor blood glucose levels   -Hypoglycemia protocol     ~Essential Hypertension  -Cozaar 50mg daily  -Hydralazine 10mg TID    ~PEG tube assesment   General surgery: PEG site is clean resume tube feeds. Abdomen is soft.     ~Oral Candidiasis  -On nystatin oral swab via nurse. ~Apniec episodes 2/2 JAIRON  -Bipap overnight  -Respiratory care eval and treat  -Albuterol aerosols. DVT ppx: on heaprin subcu TID. GI ppx: famotidine 20mg daily. Palliative Care: In discussion with family regarding goals of care, family support and CODE status.      Danielito Goodwin MD  8/12/2021  1:39 PM     Attending Physician Statement    I have discussed the case of Evelina Gottron, including pertinent history and exam findings with the resident. I have seen and examined the patient and the key elements of the encounter have been performed by me. I agree with the assessment, plan, and orders as documented by the resident. His overall prognosis is poor and at this time there is no indication for continuous heparin dosing for full anticoagulation.   Electronically signed by Lotus Antonio MD on 8/12/2021 at 1:40 PM Statement Selected

## 2021-09-21 ENCOUNTER — APPOINTMENT (OUTPATIENT)
Dept: DERMATOLOGY | Facility: CLINIC | Age: 36
End: 2021-09-21
Payer: COMMERCIAL

## 2021-09-21 ENCOUNTER — NON-APPOINTMENT (OUTPATIENT)
Age: 36
End: 2021-09-21

## 2021-09-21 VITALS — WEIGHT: 130 LBS | HEIGHT: 66 IN | BODY MASS INDEX: 20.89 KG/M2

## 2021-09-21 DIAGNOSIS — L21.9 SEBORRHEIC DERMATITIS, UNSPECIFIED: ICD-10-CM

## 2021-09-21 DIAGNOSIS — L30.1 DYSHIDROSIS [POMPHOLYX]: ICD-10-CM

## 2021-09-21 PROCEDURE — 99204 OFFICE O/P NEW MOD 45 MIN: CPT

## 2021-11-03 ENCOUNTER — APPOINTMENT (OUTPATIENT)
Dept: DERMATOLOGY | Facility: CLINIC | Age: 36
End: 2021-11-03
Payer: COMMERCIAL

## 2021-11-03 DIAGNOSIS — L30.9 DERMATITIS, UNSPECIFIED: ICD-10-CM

## 2021-11-03 PROCEDURE — 99214 OFFICE O/P EST MOD 30 MIN: CPT

## 2021-12-23 ENCOUNTER — APPOINTMENT (OUTPATIENT)
Dept: DERMATOLOGY | Facility: CLINIC | Age: 36
End: 2021-12-23

## 2022-02-03 ENCOUNTER — APPOINTMENT (OUTPATIENT)
Dept: DERMATOLOGY | Facility: CLINIC | Age: 37
End: 2022-02-03
Payer: COMMERCIAL

## 2022-02-03 PROCEDURE — 99213 OFFICE O/P EST LOW 20 MIN: CPT

## 2022-02-24 ENCOUNTER — APPOINTMENT (OUTPATIENT)
Dept: DERMATOLOGY | Facility: CLINIC | Age: 37
End: 2022-02-24
Payer: SELF-PAY

## 2022-02-24 DIAGNOSIS — L98.8 OTHER SPECIFIED DISORDERS OF THE SKIN AND SUBCUTANEOUS TISSUE: ICD-10-CM

## 2022-02-24 PROCEDURE — D0104: CPT

## 2022-02-24 NOTE — ASSESSMENT
[FreeTextEntry1] : Risks and benefits of filler treatment were discussed including:\par Bleeding, bruising, pain, subcutaneous nodules, temporary nature of treatment.\par \par Filler:  Restylane  [employer] \par \par Number of syringes:  1\par Volume: 0.6 cc\par Site: b/l under eyes\par \par discussed bruising and swelling after procedure. encouraged massage and ice packs.\par \par f/u 2 weeks\par can email pictures to [email or web address]\par

## 2022-02-25 ENCOUNTER — NON-APPOINTMENT (OUTPATIENT)
Age: 37
End: 2022-02-25

## 2022-02-28 ENCOUNTER — NON-APPOINTMENT (OUTPATIENT)
Age: 37
End: 2022-02-28

## 2022-03-01 ENCOUNTER — NON-APPOINTMENT (OUTPATIENT)
Age: 37
End: 2022-03-01

## 2022-03-10 ENCOUNTER — APPOINTMENT (OUTPATIENT)
Dept: DERMATOLOGY | Facility: CLINIC | Age: 37
End: 2022-03-10
Payer: COMMERCIAL

## 2022-03-10 PROCEDURE — 99024 POSTOP FOLLOW-UP VISIT: CPT

## 2022-03-24 ENCOUNTER — APPOINTMENT (OUTPATIENT)
Dept: DERMATOLOGY | Facility: CLINIC | Age: 37
End: 2022-03-24
Payer: SELF-PAY

## 2022-03-24 PROCEDURE — D0137: CPT

## 2022-03-24 NOTE — ASSESSMENT
[FreeTextEntry1] : eyelid filler\par education\par prednisone 30 mg PO daily x3 days then 20 mg PO daily x3 days then 10 mg PO daily x3 days; SED\par \par if no improvement in 2-3 weeks\par will dissolve filler\par either dissolve and refund or dissolve and reinject 2-3 weeks after

## 2022-03-24 NOTE — HISTORY OF PRESENT ILLNESS
[FreeTextEntry1] : swelling after under eye filler [de-identified] : 36 year old female here for swelling after under eye filler 1 month prior. slightly improved but still present.

## 2022-04-14 ENCOUNTER — APPOINTMENT (OUTPATIENT)
Dept: DERMATOLOGY | Facility: CLINIC | Age: 37
End: 2022-04-14
Payer: COMMERCIAL

## 2022-04-14 PROCEDURE — D0137: CPT

## 2022-04-15 ENCOUNTER — APPOINTMENT (OUTPATIENT)
Dept: INTERNAL MEDICINE | Facility: CLINIC | Age: 37
End: 2022-04-15
Payer: COMMERCIAL

## 2022-04-15 ENCOUNTER — NON-APPOINTMENT (OUTPATIENT)
Age: 37
End: 2022-04-15

## 2022-04-15 VITALS
HEIGHT: 66 IN | TEMPERATURE: 97.9 F | HEART RATE: 75 BPM | OXYGEN SATURATION: 97 % | WEIGHT: 128 LBS | DIASTOLIC BLOOD PRESSURE: 68 MMHG | RESPIRATION RATE: 16 BRPM | BODY MASS INDEX: 20.57 KG/M2 | SYSTOLIC BLOOD PRESSURE: 104 MMHG

## 2022-04-15 PROCEDURE — 99395 PREV VISIT EST AGE 18-39: CPT

## 2022-04-15 RX ORDER — PREDNISONE 10 MG/1
10 TABLET ORAL
Qty: 18 | Refills: 0 | Status: DISCONTINUED | COMMUNITY
Start: 2022-03-24 | End: 2022-04-15

## 2022-04-15 RX ORDER — TACROLIMUS 1 MG/G
0.1 OINTMENT TOPICAL
Qty: 60 | Refills: 0 | Status: DISCONTINUED | COMMUNITY
Start: 2021-11-03 | End: 2022-04-15

## 2022-04-19 LAB
ALBUMIN SERPL ELPH-MCNC: 4.8 G/DL
ALP BLD-CCNC: 55 U/L
ALT SERPL-CCNC: 11 U/L
ANION GAP SERPL CALC-SCNC: 13 MMOL/L
AST SERPL-CCNC: 18 U/L
BASOPHILS # BLD AUTO: 0.06 K/UL
BASOPHILS NFR BLD AUTO: 0.9 %
BILIRUB SERPL-MCNC: 0.6 MG/DL
BUN SERPL-MCNC: 15 MG/DL
CALCIUM SERPL-MCNC: 9.9 MG/DL
CHLORIDE SERPL-SCNC: 104 MMOL/L
CHOLEST SERPL-MCNC: 233 MG/DL
CO2 SERPL-SCNC: 24 MMOL/L
CREAT SERPL-MCNC: 0.74 MG/DL
EGFR: 107 ML/MIN/1.73M2
EOSINOPHIL # BLD AUTO: 0.13 K/UL
EOSINOPHIL NFR BLD AUTO: 2 %
GLUCOSE SERPL-MCNC: 88 MG/DL
HCT VFR BLD CALC: 39.6 %
HDLC SERPL-MCNC: 83 MG/DL
HGB BLD-MCNC: 12.5 G/DL
IMM GRANULOCYTES NFR BLD AUTO: 0.2 %
LDLC SERPL CALC-MCNC: 141 MG/DL
LYMPHOCYTES # BLD AUTO: 2.03 K/UL
LYMPHOCYTES NFR BLD AUTO: 31.8 %
MAN DIFF?: NORMAL
MCHC RBC-ENTMCNC: 30 PG
MCHC RBC-ENTMCNC: 31.6 GM/DL
MCV RBC AUTO: 95.2 FL
MONOCYTES # BLD AUTO: 0.59 K/UL
MONOCYTES NFR BLD AUTO: 9.2 %
NEUTROPHILS # BLD AUTO: 3.56 K/UL
NEUTROPHILS NFR BLD AUTO: 55.9 %
NONHDLC SERPL-MCNC: 149 MG/DL
PLATELET # BLD AUTO: 334 K/UL
POTASSIUM SERPL-SCNC: 4.7 MMOL/L
PROT SERPL-MCNC: 7 G/DL
RBC # BLD: 4.16 M/UL
RBC # FLD: 13 %
SODIUM SERPL-SCNC: 141 MMOL/L
TRIGL SERPL-MCNC: 43 MG/DL
TSH SERPL-ACNC: 2.26 UIU/ML
WBC # FLD AUTO: 6.38 K/UL

## 2022-05-03 ENCOUNTER — NON-APPOINTMENT (OUTPATIENT)
Age: 37
End: 2022-05-03

## 2022-05-04 ENCOUNTER — APPOINTMENT (OUTPATIENT)
Dept: DERMATOLOGY | Facility: CLINIC | Age: 37
End: 2022-05-04
Payer: COMMERCIAL

## 2022-05-04 DIAGNOSIS — T14.8XXA OTHER INJURY OF UNSPECIFIED BODY REGION, INITIAL ENCOUNTER: ICD-10-CM

## 2022-05-04 PROCEDURE — D0137: CPT

## 2022-05-04 NOTE — HISTORY OF PRESENT ILLNESS
[FreeTextEntry1] : swelling after under eye filler [de-identified] : 36 year old female here for swelling after under eye filler 3 month prior. slightly improved but still present. s/p hylenex x1.

## 2022-05-04 NOTE — ASSESSMENT
[FreeTextEntry1] : eyelid filler\par education\par s/p pred course with minimal improvement\par \par filler dissolved today with 15U hylenex on left lower eyelid\par refund issued last visit\par \par add pred at higher dose; 60 mg PO daily x3 days then 40 mg x 3 days then 20 mg x3 days; SED\par given diflucan for yeast infection patient had with pred last visit\par \par f/u 2 weeks

## 2022-05-18 ENCOUNTER — APPOINTMENT (OUTPATIENT)
Dept: DERMATOLOGY | Facility: CLINIC | Age: 37
End: 2022-05-18
Payer: COMMERCIAL

## 2022-05-18 DIAGNOSIS — L81.9 DISORDER OF PIGMENTATION, UNSPECIFIED: ICD-10-CM

## 2022-05-18 PROCEDURE — 99024 POSTOP FOLLOW-UP VISIT: CPT

## 2022-05-18 NOTE — ASSESSMENT
[FreeTextEntry1] : eyelid filler\par education\par \par filler dissolved today with 15U hylenex on left lower eyelid\par refund issued last visit\par \par improving\par s/p pred\par \par f/u 2 weeks

## 2022-06-01 ENCOUNTER — APPOINTMENT (OUTPATIENT)
Dept: DERMATOLOGY | Facility: CLINIC | Age: 37
End: 2022-06-01

## 2022-09-07 NOTE — DISCHARGE NOTE OB - CALL YOUR HEALTHCARE PROVIDER IF YOU ARE EXPERIENCING SYMPTOMS OF DEPRESSION THAT LAST MORE THAN THREE DAYS
Statement Selected Advancement Flap (Single) Text: The defect edges were debeveled with a #15 scalpel blade.  Given the location of the defect and the proximity to free margins a single advancement flap was deemed most appropriate.  Using a sterile surgical marker, an appropriate advancement flap was drawn incorporating the defect and placing the expected incisions within the relaxed skin tension lines where possible.    The area thus outlined was incised deep to adipose tissue with a #15 scalpel blade.  The skin margins were undermined to an appropriate distance in all directions utilizing iris scissors.

## 2022-10-07 ENCOUNTER — APPOINTMENT (OUTPATIENT)
Dept: INTERNAL MEDICINE | Facility: CLINIC | Age: 37
End: 2022-10-07

## 2022-10-07 VITALS
HEIGHT: 66 IN | BODY MASS INDEX: 21.05 KG/M2 | HEART RATE: 65 BPM | TEMPERATURE: 97.6 F | SYSTOLIC BLOOD PRESSURE: 116 MMHG | RESPIRATION RATE: 16 BRPM | WEIGHT: 131 LBS | OXYGEN SATURATION: 100 % | DIASTOLIC BLOOD PRESSURE: 75 MMHG

## 2022-10-07 DIAGNOSIS — E78.5 HYPERLIPIDEMIA, UNSPECIFIED: ICD-10-CM

## 2022-10-07 PROCEDURE — G0008: CPT

## 2022-10-07 PROCEDURE — 99213 OFFICE O/P EST LOW 20 MIN: CPT | Mod: 25

## 2022-10-07 PROCEDURE — 90686 IIV4 VACC NO PRSV 0.5 ML IM: CPT

## 2022-10-07 RX ORDER — FLUCONAZOLE 200 MG/1
200 TABLET ORAL
Qty: 1 | Refills: 3 | Status: COMPLETED | COMMUNITY
Start: 2022-05-04 | End: 2022-10-07

## 2022-10-07 RX ORDER — PREDNISONE 20 MG/1
20 TABLET ORAL
Qty: 18 | Refills: 0 | Status: COMPLETED | COMMUNITY
Start: 2022-05-04 | End: 2022-10-07

## 2022-10-11 LAB
CHOLEST SERPL-MCNC: 180 MG/DL
HDLC SERPL-MCNC: 71 MG/DL
LDLC SERPL CALC-MCNC: 100 MG/DL
NONHDLC SERPL-MCNC: 109 MG/DL
TRIGL SERPL-MCNC: 46 MG/DL

## 2022-11-17 ENCOUNTER — APPOINTMENT (OUTPATIENT)
Dept: ULTRASOUND IMAGING | Facility: CLINIC | Age: 37
End: 2022-11-17

## 2022-11-17 ENCOUNTER — APPOINTMENT (OUTPATIENT)
Dept: MAMMOGRAPHY | Facility: CLINIC | Age: 37
End: 2022-11-17

## 2022-11-17 PROCEDURE — 77067 SCR MAMMO BI INCL CAD: CPT

## 2022-11-17 PROCEDURE — 76641 ULTRASOUND BREAST COMPLETE: CPT | Mod: 50

## 2022-11-17 PROCEDURE — 77063 BREAST TOMOSYNTHESIS BI: CPT

## 2022-11-21 ENCOUNTER — APPOINTMENT (OUTPATIENT)
Dept: UROGYNECOLOGY | Facility: CLINIC | Age: 37
End: 2022-11-21

## 2022-11-21 VITALS
TEMPERATURE: 98.6 F | SYSTOLIC BLOOD PRESSURE: 121 MMHG | BODY MASS INDEX: 20.89 KG/M2 | HEIGHT: 66 IN | HEART RATE: 82 BPM | DIASTOLIC BLOOD PRESSURE: 64 MMHG | WEIGHT: 130 LBS

## 2022-11-21 PROCEDURE — 51701 INSERT BLADDER CATHETER: CPT

## 2022-11-21 PROCEDURE — 99204 OFFICE O/P NEW MOD 45 MIN: CPT | Mod: 25

## 2022-11-21 PROCEDURE — 81003 URINALYSIS AUTO W/O SCOPE: CPT | Mod: QW

## 2022-11-22 LAB
APPEARANCE: CLEAR
BACTERIA: NEGATIVE
BILIRUB UR QL STRIP: NORMAL
BILIRUBIN URINE: NEGATIVE
BLOOD URINE: NEGATIVE
CLARITY UR: CLEAR
COLLECTION METHOD: NORMAL
COLOR: NORMAL
GLUCOSE QUALITATIVE U: NEGATIVE
GLUCOSE UR-MCNC: NORMAL
HCG UR QL: 0.2 EU/DL
HGB UR QL STRIP.AUTO: NORMAL
HYALINE CASTS: 1 /LPF
KETONES UR-MCNC: NORMAL
KETONES URINE: NEGATIVE
LEUKOCYTE ESTERASE UR QL STRIP: NORMAL
LEUKOCYTE ESTERASE URINE: NEGATIVE
MICROSCOPIC-UA: NORMAL
NITRITE UR QL STRIP: NORMAL
NITRITE URINE: NEGATIVE
PH UR STRIP: 7.5
PH URINE: 7.5
PROT UR STRIP-MCNC: 30
PROTEIN URINE: NORMAL
RED BLOOD CELLS URINE: 1 /HPF
SP GR UR STRIP: 1.02
SPECIFIC GRAVITY URINE: 1.03
SQUAMOUS EPITHELIAL CELLS: 0 /HPF
UROBILINOGEN URINE: NORMAL
WHITE BLOOD CELLS URINE: 0 /HPF

## 2022-11-22 NOTE — PHYSICAL EXAM
[Chaperone Present] : A chaperone was present in the examining room during all aspects of the physical examination [No Acute Distress] : in no acute distress [Well developed] : well developed [Well Nourished] : ~L well nourished [Normal Mood/Affect] : mood and affect are normal [Respirations regular] : ~T respiratory rate was regular [Normal Gait] : gait was normal [Normal Appearance] : general appearance was normal [Rectocele] : a rectocele [Ap ____] : Ap [unfilled] [Bp ____] : Bp [unfilled] [IUD String] : had an IUD string protruding out [Normal] : normal [Post Void Residual ____ml] : post void residual was [unfilled] ml [Normal rectal exam] : was normal [Anxiety] : patient is not anxious [Distended] : not distended [FreeTextEntry3] : Positive hypermobility

## 2022-11-22 NOTE — DISCUSSION/SUMMARY
[FreeTextEntry1] : I reviewed the above findings with the patient with visual illustrations. \par Treatment options for the stress incontinence were discussed and included doing nothing, behavioral modification, Kegel's exercises with and without PT, medications, a pessary or intravaginal devices, periurethral bulking, and surgical correction with a suburethral sling v Wolff v autologous sling.\par Treatment options for the prolapse were discussed and included doing nothing, Kegel exercises and behavioral modification, a pessary, or surgical correction.\par She is interested in surgical correction and we discussed returning for urodynamic testing to further evaluate her urinary complaints.\par We discussed that urine dipstick showed proteinuria and have asked her to follow-up with her medical doctor for such.  A copy of the result was given to her.\par \par IUGA patient information on pelvic organ prolapse and stress incontinence was given to her.  All questions were answered.\par

## 2022-11-22 NOTE — HISTORY OF PRESENT ILLNESS
[Unable To Restrain Bowel Movement] : no [Urinary Frequency] : no [Feelings Of Urinary Urgency] : no [Urinary Tract Infection] : no [Urinary Frequency More Than Twice At Night (Nocturia)] : no nocturia [] : yes [Uses ___ pads per day] : uses [unfilled] pad(s) per day [Incomplete Emptying Of Stool] : no [de-identified] : daily with laugh, cough, sneeze, running - has gotten worse in the past few months [de-identified] : + sexually active

## 2022-12-07 ENCOUNTER — APPOINTMENT (OUTPATIENT)
Dept: UROGYNECOLOGY | Facility: CLINIC | Age: 37
End: 2022-12-07

## 2022-12-07 ENCOUNTER — OUTPATIENT (OUTPATIENT)
Dept: OUTPATIENT SERVICES | Facility: HOSPITAL | Age: 37
LOS: 1 days | End: 2022-12-07
Payer: COMMERCIAL

## 2022-12-07 VITALS
WEIGHT: 130 LBS | DIASTOLIC BLOOD PRESSURE: 69 MMHG | BODY MASS INDEX: 20.89 KG/M2 | HEIGHT: 66 IN | SYSTOLIC BLOOD PRESSURE: 106 MMHG

## 2022-12-07 DIAGNOSIS — N39.3 STRESS INCONTINENCE (FEMALE) (MALE): ICD-10-CM

## 2022-12-07 DIAGNOSIS — J34.2 DEVIATED NASAL SEPTUM: Chronic | ICD-10-CM

## 2022-12-07 PROCEDURE — 51797 INTRAABDOMINAL PRESSURE TEST: CPT

## 2022-12-07 PROCEDURE — 51729 CYSTOMETROGRAM W/VP&UP: CPT

## 2022-12-07 PROCEDURE — 51797 INTRAABDOMINAL PRESSURE TEST: CPT | Mod: 26

## 2022-12-07 PROCEDURE — 51784 ANAL/URINARY MUSCLE STUDY: CPT | Mod: 26

## 2022-12-07 PROCEDURE — 51729 CYSTOMETROGRAM W/VP&UP: CPT | Mod: 26

## 2022-12-07 PROCEDURE — 51784 ANAL/URINARY MUSCLE STUDY: CPT

## 2022-12-29 DIAGNOSIS — Z01.818 ENCOUNTER FOR OTHER PREPROCEDURAL EXAMINATION: ICD-10-CM

## 2023-01-18 ENCOUNTER — APPOINTMENT (OUTPATIENT)
Dept: UROGYNECOLOGY | Facility: CLINIC | Age: 38
End: 2023-01-18
Payer: COMMERCIAL

## 2023-01-18 PROCEDURE — 99214 OFFICE O/P EST MOD 30 MIN: CPT

## 2023-01-18 NOTE — PHYSICAL EXAM
[No Acute Distress] : in no acute distress [Well developed] : well developed [Well Nourished] : ~L well nourished [Oriented x3] : oriented to person, place, and time [Normal Memory] : ~T memory was ~L unimpaired [Respirations regular] : ~T respiratory rate was regular [Normal Appearance] : ~T the appearance of the neck was normal [Warm and Dry] : was warm and dry to touch [Normal Gait] : gait was normal

## 2023-01-18 NOTE — DISCUSSION/SUMMARY
[FreeTextEntry1] : Nicolle is a 36 yo who presents for follow up on prolapse and urinary incontinence. Treatment options for the stress incontinence were discussed and included doing nothing, behavioral modification, Kegel's exercises with and without PT, medications, a pessary or intravaginal devices, periurethral bulking, and surgical correction with a suburethral sling v Wolff v autologous sling. She is interested in surgical correction would like to schedule a mid urethral sling. In regards to her prolapse, nonsurgical and surgical options were discussed including observation, pessary and concomitant surgery for this. She desires to proceed with posterior repair at the time same time. Risks and benefits of surgery were discussed. Discussed the possibility of going home with a catheter. Patient understands and would like to proceed. IUGA on posterior repair, MUS were given to patient. RTO for preop appointment. \par

## 2023-01-18 NOTE — HISTORY OF PRESENT ILLNESS
[FreeTextEntry1] : \par Nicolle is a 38 yo who presents for follow up today on urinary incontinence and prolapse. She no longer desires future childbearing. \par \par PMHx: HLD \par PSHx: deviated nasal septum surgery,  x 2 \par \par UDS: +-400mL. No DO. PVR 0mL. \par

## 2023-04-02 PROBLEM — N81.6 RECTOCELE, FEMALE: Status: ACTIVE | Noted: 2022-11-21

## 2023-04-02 PROBLEM — N36.41 URETHRAL HYPERMOBILITY: Status: ACTIVE | Noted: 2022-11-21

## 2023-04-03 ENCOUNTER — APPOINTMENT (OUTPATIENT)
Dept: UROGYNECOLOGY | Facility: CLINIC | Age: 38
End: 2023-04-03
Payer: COMMERCIAL

## 2023-04-03 VITALS
BODY MASS INDEX: 20.89 KG/M2 | OXYGEN SATURATION: 99 % | HEIGHT: 66 IN | HEART RATE: 78 BPM | WEIGHT: 130 LBS | SYSTOLIC BLOOD PRESSURE: 105 MMHG | DIASTOLIC BLOOD PRESSURE: 72 MMHG

## 2023-04-03 DIAGNOSIS — N36.41 HYPERMOBILITY OF URETHRA: ICD-10-CM

## 2023-04-03 DIAGNOSIS — N81.6 RECTOCELE: ICD-10-CM

## 2023-04-03 PROCEDURE — 99214 OFFICE O/P EST MOD 30 MIN: CPT

## 2023-04-03 NOTE — PATIENT PROFILE OB - MENTAL HEALTH CONDITIONS/SYMPTOMS, PROFILE
Trisha Schwartz is here at 36w4d for:    Chief Complaint   Patient presents with    Routine Prenatal Visit     Follow up in house ultrasound. C/O RLQ pain that radiates around to back. Estimated Due Date: Estimated Date of Delivery: 23    OB History    Para Term  AB Living   2 1 1     1   SAB IAB Ectopic Molar Multiple Live Births           0 1      # Outcome Date GA Lbr Ortega/2nd Weight Sex Delivery Anes PTL Lv   2 Current            1 Term 21 38w3d 03:05 :28 6 lb 5.6 oz (2.88 kg) M Vag-Spont EPI N RASHEED        Past Medical History:   Diagnosis Date    Anxiety     Asthma     as a child    Depression     anxiety    GERD (gastroesophageal reflux disease)     Hyperemesis        Past Surgical History:   Procedure Laterality Date    NASAL SINUS SURGERY      WISDOM TOOTH EXTRACTION         Social History     Tobacco Use   Smoking Status Former    Packs/day: 0.50    Types: Cigarettes    Quit date: 2021    Years since quittin.0   Smokeless Tobacco Never        Social History     Substance and Sexual Activity   Alcohol Use Not Currently       No results found for this visit on 23. HPI: here for routine ob visit, denies c/o     PT denies fever, chills, nausea and vomiting       Vitals:  Estimated body mass index is 36.14 kg/m² as calculated from the following:    Height as of 22: 5' 5\" (1.651 m). Weight as of this encounter: 217 lb 3.2 oz (98.5 kg).   Weight: 217 lb 3.2 oz (98.5 kg)  Patient Position: Sitting  Albumin: Trace  Glucose: Negative  Fundal Height (cm): 38 cm  Fetal HR: 136  Movement: Present  Presentation: Vertex  Dilation (cm): 2  Effacement: 70  Station: -1  Comments: post           Abdomen: round,soft, nontender    Results reviewed today:  Sono:  37.4 WK IUP  EFW:33% (6lb 13oz)  CL:unable to measure, due to fetal head position   EARL:11.7cm  HR:145bpm  posterior placenta, cephalic presentation  Active fetal movements   No results found for
none

## 2023-04-03 NOTE — DISCUSSION/SUMMARY
[FreeTextEntry1] : Nicolle is a 38y/o who presents today for surgical counseling. We reviewed treatment options for her RIKA and prolapse, and she continues to desire insertion of retropubic midurethral sling and rectocele repair. \par \par \par Regarding her RIKA, we discussed options of transobturator, retropubic, and mini sling. She desires a retropubic midurethral sling.   We reviewed risks to the procedure including, but not limited to: bleeding, infection, pain, urinary retention requiring an indwelling catheter, persistence or recurrence of stress incontinence, failure of procedure to alleviate symptoms, development of overactive bladder or urge incontinence, voiding dysfunction, dyspareunia. We also extensively reviewed the risk of injury to the bladder, ureters, urethra, vagina, and bowel. We also discussed the risk of sling mesh exposure, fistula formation, neuropathy, erosion, pain, and need for reoperation. We discussed the possibility of going home with a catheter. Risks were explained in layman's terms. \par \par \par Regarding her prolapse, we reviewed risks of undergoing rectocele repair including, but not limited to: bleeding, infection, pain, urinary retention requiring an indwelling catheter, fecal incontinence, failure of procedure to alleviate symptoms, persistence/recurrence of prolapse, and dyspareunia. We also extensively reviewed the risk of injury to the vagina, rectum, and bowel. We discussed the risk of need for further surgery and surgical revision. Risks were explained in layman's terms. \par \par \par She was counseled on the elective nature of the surgery. She expressed understanding of these risks and continues to desire the planned surgical procedure. We reviewed preoperative and postoperative instructions. \par \par Consent was signed in the office for: pelvic examination under anesthesia, insertion of retropubic midurethral sling, cystoscopy, posterior repair, possible anterior repair. She is aware of learners participating in her care. All questions were answered.\par \par

## 2023-04-03 NOTE — PHYSICAL EXAM
[Chaperone Present] : A chaperone was present in the examining room during all aspects of the physical examination [No Acute Distress] : in no acute distress [Well developed] : well developed [Well Nourished] : ~L well nourished [Normal Mood/Affect] : mood and affect are normal [Respirations regular] : ~T respiratory rate was regular [Normal Gait] : gait was normal [Normal Appearance] : general appearance was normal [Rectocele] : a rectocele [Ap ____] : Ap [unfilled] [Bp ____] : Bp [unfilled] [IUD String] : had an IUD string protruding out [Normal] : normal [Post Void Residual ____ml] : post void residual was [unfilled] ml [Anxiety] : patient is not anxious [Distended] : not distended [FreeTextEntry3] : Positive hypermobility

## 2023-04-03 NOTE — HISTORY OF PRESENT ILLNESS
[FreeTextEntry1] : Patient is a 37F with Stage II prolapse and RIKA here for pre-surgical counseling.  Patient denies any new complaints.  She continues to desire surgical management.  Her pre-operative workup is as follows:\par \par UDS (12/7): shelter 400 mL, +-400 mL, no DO, PVR 0 cc.\par \par PMH: denies\par PSH: denies\par

## 2023-04-04 ENCOUNTER — OUTPATIENT (OUTPATIENT)
Dept: OUTPATIENT SERVICES | Facility: HOSPITAL | Age: 38
LOS: 1 days | End: 2023-04-04
Payer: COMMERCIAL

## 2023-04-04 VITALS
DIASTOLIC BLOOD PRESSURE: 70 MMHG | SYSTOLIC BLOOD PRESSURE: 120 MMHG | WEIGHT: 134.04 LBS | OXYGEN SATURATION: 100 % | TEMPERATURE: 98 F | HEART RATE: 78 BPM | RESPIRATION RATE: 16 BRPM | HEIGHT: 66 IN

## 2023-04-04 DIAGNOSIS — N39.3 STRESS INCONTINENCE (FEMALE) (MALE): ICD-10-CM

## 2023-04-04 DIAGNOSIS — J34.2 DEVIATED NASAL SEPTUM: Chronic | ICD-10-CM

## 2023-04-04 DIAGNOSIS — N18.6 END STAGE RENAL DISEASE: ICD-10-CM

## 2023-04-04 DIAGNOSIS — Z98.890 OTHER SPECIFIED POSTPROCEDURAL STATES: Chronic | ICD-10-CM

## 2023-04-04 LAB
ANION GAP SERPL CALC-SCNC: 13 MMOL/L — SIGNIFICANT CHANGE UP (ref 5–17)
BLD GP AB SCN SERPL QL: NEGATIVE — SIGNIFICANT CHANGE UP
BUN SERPL-MCNC: 19 MG/DL — SIGNIFICANT CHANGE UP (ref 7–23)
CALCIUM SERPL-MCNC: 9.6 MG/DL — SIGNIFICANT CHANGE UP (ref 8.4–10.5)
CHLORIDE SERPL-SCNC: 103 MMOL/L — SIGNIFICANT CHANGE UP (ref 96–108)
CO2 SERPL-SCNC: 24 MMOL/L — SIGNIFICANT CHANGE UP (ref 22–31)
CREAT SERPL-MCNC: 0.81 MG/DL — SIGNIFICANT CHANGE UP (ref 0.5–1.3)
EGFR: 96 ML/MIN/1.73M2 — SIGNIFICANT CHANGE UP
GLUCOSE SERPL-MCNC: 116 MG/DL — HIGH (ref 70–99)
HCT VFR BLD CALC: 40.7 % — SIGNIFICANT CHANGE UP (ref 34.5–45)
HGB BLD-MCNC: 13.2 G/DL — SIGNIFICANT CHANGE UP (ref 11.5–15.5)
MCHC RBC-ENTMCNC: 30.5 PG — SIGNIFICANT CHANGE UP (ref 27–34)
MCHC RBC-ENTMCNC: 32.4 GM/DL — SIGNIFICANT CHANGE UP (ref 32–36)
MCV RBC AUTO: 94 FL — SIGNIFICANT CHANGE UP (ref 80–100)
NRBC # BLD: 0 /100 WBCS — SIGNIFICANT CHANGE UP (ref 0–0)
PLATELET # BLD AUTO: 269 K/UL — SIGNIFICANT CHANGE UP (ref 150–400)
POTASSIUM SERPL-MCNC: 4 MMOL/L — SIGNIFICANT CHANGE UP (ref 3.5–5.3)
POTASSIUM SERPL-SCNC: 4 MMOL/L — SIGNIFICANT CHANGE UP (ref 3.5–5.3)
RBC # BLD: 4.33 M/UL — SIGNIFICANT CHANGE UP (ref 3.8–5.2)
RBC # FLD: 12.6 % — SIGNIFICANT CHANGE UP (ref 10.3–14.5)
RH IG SCN BLD-IMP: NEGATIVE — SIGNIFICANT CHANGE UP
SODIUM SERPL-SCNC: 140 MMOL/L — SIGNIFICANT CHANGE UP (ref 135–145)
WBC # BLD: 7.9 K/UL — SIGNIFICANT CHANGE UP (ref 3.8–10.5)
WBC # FLD AUTO: 7.9 K/UL — SIGNIFICANT CHANGE UP (ref 3.8–10.5)

## 2023-04-04 PROCEDURE — 85027 COMPLETE CBC AUTOMATED: CPT

## 2023-04-04 PROCEDURE — 80048 BASIC METABOLIC PNL TOTAL CA: CPT

## 2023-04-04 PROCEDURE — 86850 RBC ANTIBODY SCREEN: CPT

## 2023-04-04 PROCEDURE — 86901 BLOOD TYPING SEROLOGIC RH(D): CPT

## 2023-04-04 PROCEDURE — G0463: CPT

## 2023-04-04 PROCEDURE — 86900 BLOOD TYPING SEROLOGIC ABO: CPT

## 2023-04-04 RX ORDER — SODIUM CHLORIDE 9 MG/ML
3 INJECTION INTRAMUSCULAR; INTRAVENOUS; SUBCUTANEOUS EVERY 8 HOURS
Refills: 0 | Status: DISCONTINUED | OUTPATIENT
Start: 2023-04-20 | End: 2023-05-04

## 2023-04-04 RX ORDER — SODIUM CHLORIDE 9 MG/ML
1000 INJECTION, SOLUTION INTRAVENOUS
Refills: 0 | Status: DISCONTINUED | OUTPATIENT
Start: 2023-04-20 | End: 2023-05-04

## 2023-04-04 NOTE — H&P PST ADULT - ASSESSMENT
ANES:    Activity:   physically pretty active, Gym -[employer] 3-4x/week , home chores, walks a lot  Energy Expenditure score (DASI SCORE METS): 9.6  Symptoms : denies chest pain, palpitations, dyspnea, dizziness or  APONTE,     Airway: normal  Dental: Patient denies Loose teeth/Denture.

## 2023-04-04 NOTE — H&P PST ADULT - NEGATIVE ENMT SYMPTOMS
no ear pain/no tinnitus/no vertigo/no sinus symptoms/no nasal congestion/no nasal discharge/no nasal obstruction/no post-nasal discharge/no throat pain/no dysphagia

## 2023-04-04 NOTE — H&P PST ADULT - NSICDXPASTMEDICALHX_GEN_ALL_CORE_FT
PAST MEDICAL HISTORY:  Deviated nasal septum     Female genital prolapse     Hypertrophy of nasal turbinates     Stress incontinence

## 2023-04-04 NOTE — H&P PST ADULT - HISTORY OF PRESENT ILLNESS
37 year old female  (NVD 2018 & in 2020),  reports having stress incontinence with genital prolapse after vaginal delivery especially from the first vaginal delivery in 2018, noticed its getting worse &  presents for scheduled Midurethral sling, cystoscopy, posterior repair, possible anterior repair on 2023.    S/P Covid vaccine  up to date with booster dose.  H/O Covid infection : /.     Denies recent travel, exposure or any covid symptoms.  37 year old female  (NVD 2018 & in 2020), LMP 23,  reports having stress incontinence with genital prolapse after vaginal delivery especially from the first vaginal delivery in 2018, noticed its getting worse &  presents for scheduled Midurethral sling, cystoscopy, posterior repair, possible anterior repair on 2023.    S/P Covid vaccine  up to date with booster dose.  H/O Covid infection : /.     Denies recent travel, exposure or any covid symptoms.

## 2023-04-04 NOTE — H&P PST ADULT - NSICDXFAMILYHX_GEN_ALL_CORE_FT
FAMILY HISTORY:  Mother  Still living? Yes, Estimated age: Age Unknown  Family history of breast cancer in mother, Age at diagnosis: Age Unknown  Family history of hypertension, Age at diagnosis: Age Unknown

## 2023-04-04 NOTE — H&P PST ADULT - PROBLEM SELECTOR PLAN 1
Midurethral sling, cystoscopy, posterior repair, possible anterior repair on 04/20/2023.  Preop cbc, bmp, T/S sent  Pt states she had URine culture done by surgeon's office on 4/3/23.  Preop ABO & UCG DOS

## 2023-04-10 ENCOUNTER — NON-APPOINTMENT (OUTPATIENT)
Age: 38
End: 2023-04-10

## 2023-04-10 ENCOUNTER — APPOINTMENT (OUTPATIENT)
Dept: INTERNAL MEDICINE | Facility: CLINIC | Age: 38
End: 2023-04-10
Payer: COMMERCIAL

## 2023-04-10 VITALS
WEIGHT: 132 LBS | HEIGHT: 66 IN | DIASTOLIC BLOOD PRESSURE: 72 MMHG | OXYGEN SATURATION: 99 % | TEMPERATURE: 98 F | RESPIRATION RATE: 17 BRPM | HEART RATE: 73 BPM | SYSTOLIC BLOOD PRESSURE: 107 MMHG | BODY MASS INDEX: 21.21 KG/M2

## 2023-04-10 DIAGNOSIS — Z23 ENCOUNTER FOR IMMUNIZATION: ICD-10-CM

## 2023-04-10 DIAGNOSIS — Z92.29 PERSONAL HISTORY OF OTHER DRUG THERAPY: ICD-10-CM

## 2023-04-10 DIAGNOSIS — Z01.818 ENCOUNTER FOR OTHER PREPROCEDURAL EXAMINATION: ICD-10-CM

## 2023-04-10 DIAGNOSIS — N39.3 STRESS INCONTINENCE (FEMALE) (MALE): ICD-10-CM

## 2023-04-10 PROCEDURE — 93000 ELECTROCARDIOGRAM COMPLETE: CPT

## 2023-04-10 PROCEDURE — 99215 OFFICE O/P EST HI 40 MIN: CPT | Mod: 25

## 2023-04-10 RX ORDER — NORETHINDRONE ACETATE AND ETHINYL ESTRADIOL, AND FERROUS FUMARATE 1MG-20(24)
1-20 KIT ORAL
Qty: 84 | Refills: 0 | Status: DISCONTINUED | COMMUNITY
Start: 2023-03-02

## 2023-04-10 RX ORDER — NORETHINDRONE ACETATE 5 MG/1
5 TABLET ORAL
Qty: 60 | Refills: 0 | Status: DISCONTINUED | COMMUNITY
Start: 2022-12-27

## 2023-04-10 RX ORDER — KETOCONAZOLE 20.5 MG/ML
2 SHAMPOO, SUSPENSION TOPICAL
Qty: 1 | Refills: 1 | Status: DISCONTINUED | COMMUNITY
Start: 2021-09-21 | End: 2023-04-10

## 2023-04-10 RX ORDER — LEVONORGESTREL 52 MG/1
INTRAUTERINE DEVICE INTRAUTERINE
Refills: 0 | Status: ACTIVE | COMMUNITY

## 2023-04-10 RX ORDER — CLOBETASOL PROPIONATE 0.5 MG/G
0.05 OINTMENT TOPICAL
Qty: 1 | Refills: 0 | Status: DISCONTINUED | COMMUNITY
Start: 2021-09-21 | End: 2023-04-10

## 2023-04-10 RX ORDER — NITROFURANTOIN (MONOHYDRATE/MACROCRYSTALS) 25; 75 MG/1; MG/1
100 CAPSULE ORAL
Qty: 10 | Refills: 0 | Status: ACTIVE | COMMUNITY
Start: 2023-04-10 | End: 1900-01-01

## 2023-04-10 RX ORDER — HYDROCORTISONE 25 MG/G
2.5 CREAM TOPICAL
Qty: 1 | Refills: 1 | Status: DISCONTINUED | COMMUNITY
Start: 2021-09-21 | End: 2023-04-10

## 2023-04-10 RX ORDER — KETOCONAZOLE 20 MG/G
2 CREAM TOPICAL
Qty: 1 | Refills: 11 | Status: DISCONTINUED | COMMUNITY
Start: 2021-09-21 | End: 2023-04-10

## 2023-04-10 RX ORDER — FLUOCINOLONE ACETONIDE 0.1 MG/ML
0.01 SOLUTION TOPICAL
Qty: 1 | Refills: 1 | Status: DISCONTINUED | COMMUNITY
Start: 2021-09-21 | End: 2023-04-10

## 2023-04-19 ENCOUNTER — TRANSCRIPTION ENCOUNTER (OUTPATIENT)
Age: 38
End: 2023-04-19

## 2023-04-20 ENCOUNTER — TRANSCRIPTION ENCOUNTER (OUTPATIENT)
Age: 38
End: 2023-04-20

## 2023-04-20 ENCOUNTER — APPOINTMENT (OUTPATIENT)
Dept: UROGYNECOLOGY | Facility: HOSPITAL | Age: 38
End: 2023-04-20
Payer: COMMERCIAL

## 2023-04-20 ENCOUNTER — OUTPATIENT (OUTPATIENT)
Dept: OUTPATIENT SERVICES | Facility: HOSPITAL | Age: 38
LOS: 1 days | End: 2023-04-20
Payer: COMMERCIAL

## 2023-04-20 VITALS
DIASTOLIC BLOOD PRESSURE: 72 MMHG | RESPIRATION RATE: 16 BRPM | SYSTOLIC BLOOD PRESSURE: 111 MMHG | WEIGHT: 134.04 LBS | TEMPERATURE: 98 F | HEART RATE: 796 BPM | HEIGHT: 66 IN | OXYGEN SATURATION: 100 %

## 2023-04-20 VITALS
SYSTOLIC BLOOD PRESSURE: 132 MMHG | RESPIRATION RATE: 16 BRPM | OXYGEN SATURATION: 97 % | TEMPERATURE: 99 F | HEART RATE: 88 BPM | DIASTOLIC BLOOD PRESSURE: 63 MMHG

## 2023-04-20 DIAGNOSIS — Z98.890 OTHER SPECIFIED POSTPROCEDURAL STATES: Chronic | ICD-10-CM

## 2023-04-20 DIAGNOSIS — N18.6 END STAGE RENAL DISEASE: ICD-10-CM

## 2023-04-20 PROCEDURE — 57250 REPAIR RECTUM & VAGINA: CPT

## 2023-04-20 PROCEDURE — 57288 REPAIR BLADDER DEFECT: CPT

## 2023-04-20 PROCEDURE — C1771: CPT

## 2023-04-20 DEVICE — SLING TRANSVAGINAL MID-URETHRAL ADVANTAGE FIT: Type: IMPLANTABLE DEVICE | Status: FUNCTIONAL

## 2023-04-20 RX ORDER — ONDANSETRON 8 MG/1
4 TABLET, FILM COATED ORAL ONCE
Refills: 0 | Status: DISCONTINUED | OUTPATIENT
Start: 2023-04-20 | End: 2023-05-04

## 2023-04-20 RX ORDER — HYDROMORPHONE HYDROCHLORIDE 2 MG/ML
0.5 INJECTION INTRAMUSCULAR; INTRAVENOUS; SUBCUTANEOUS
Refills: 0 | Status: DISCONTINUED | OUTPATIENT
Start: 2023-04-20 | End: 2023-04-20

## 2023-04-20 RX ORDER — SODIUM CHLORIDE 9 MG/ML
1000 INJECTION, SOLUTION INTRAVENOUS
Refills: 0 | Status: DISCONTINUED | OUTPATIENT
Start: 2023-04-20 | End: 2023-05-04

## 2023-04-20 RX ORDER — CEFAZOLIN SODIUM 1 G
2000 VIAL (EA) INJECTION ONCE
Refills: 0 | Status: COMPLETED | OUTPATIENT
Start: 2023-04-20 | End: 2023-04-20

## 2023-04-20 RX ORDER — OXYCODONE HYDROCHLORIDE 5 MG/1
1 TABLET ORAL
Qty: 5 | Refills: 0
Start: 2023-04-20

## 2023-04-20 RX ORDER — LIDOCAINE HCL 20 MG/ML
0.2 VIAL (ML) INJECTION ONCE
Refills: 0 | Status: COMPLETED | OUTPATIENT
Start: 2023-04-20 | End: 2023-04-20

## 2023-04-20 RX ADMIN — SODIUM CHLORIDE 100 MILLILITER(S): 9 INJECTION, SOLUTION INTRAVENOUS at 10:44

## 2023-04-20 RX ADMIN — HYDROMORPHONE HYDROCHLORIDE 0.5 MILLIGRAM(S): 2 INJECTION INTRAMUSCULAR; INTRAVENOUS; SUBCUTANEOUS at 14:45

## 2023-04-20 RX ADMIN — HYDROMORPHONE HYDROCHLORIDE 0.5 MILLIGRAM(S): 2 INJECTION INTRAMUSCULAR; INTRAVENOUS; SUBCUTANEOUS at 01:15

## 2023-04-20 RX ADMIN — SODIUM CHLORIDE 3 MILLILITER(S): 9 INJECTION INTRAMUSCULAR; INTRAVENOUS; SUBCUTANEOUS at 10:36

## 2023-04-20 NOTE — BRIEF OPERATIVE NOTE - OPERATION/FINDINGS
Cystoscopy demonstrating ureteral orifices emanating clear efflux b/l. Normal bladder mucosa with no evidence of injury, suture, foreign body, or tumor.    MARY CARMEN wnl

## 2023-04-20 NOTE — BRIEF OPERATIVE NOTE - NSICDXBRIEFPOSTOP_GEN_ALL_CORE_FT
POST-OP DIAGNOSIS:  Female stress incontinence 20-Apr-2023 14:25:33  Daisy Curiel  Rectocele 20-Apr-2023 14:25:43  Daisy Curiel

## 2023-04-20 NOTE — BRIEF OPERATIVE NOTE - NSICDXBRIEFPREOP_GEN_ALL_CORE_FT
PRE-OP DIAGNOSIS:  Urine, incontinence, stress female 20-Apr-2023 14:24:10  Daisy Curiel  Rectocele 20-Apr-2023 14:25:09  Daisy Curiel

## 2023-04-20 NOTE — ASU DISCHARGE PLAN (ADULT/PEDIATRIC) - CARE PROVIDER_API CALL
Ender Sutherland; STEVE)  Female Pelvic MedReconst Surg; Obstetrics and Gynecology  [address], [address]  [place] [address]  Phone: [phone]  Fax: [phone]  Established Patient  Scheduled Appointment: 05/08/2023 12:30 PM

## 2023-04-20 NOTE — ASU DISCHARGE PLAN (ADULT/PEDIATRIC) - SIGNS AND SYMPTOMS OF INFECTION: FEVER, REDNESS, SWELLING, FOUL SMELLING DISCHARGE
Behavioral Health IP Nursing Progress Note    Suicidal Ideation:  Pt denies     Current C-SSRS score: Negative screen= no ideation, behaviors or history (11/02/21 1601)      Protective Factors / Reason for Living: Ability to cope with frustration, Ability to cope with stress    Interventions:   · Implemented the Safety / Recovery Plan        Subjective: Patient reported feeling \"okay.\"     Objective:   Mental Health: Patient behavior observed to be depressed and anxious. Pt was seen up on unit watching television.     Medical:   • Pain     Assessment / Actions:   PRN Medications given?   No    Plan:   Treatment Plan reviewed.     Statement Selected

## 2023-04-20 NOTE — ASU DISCHARGE PLAN (ADULT/PEDIATRIC) - PROVIDER TOKENS
PROVIDER:[TOKEN:[[id number]],SCHEDULEDAPPT:[05/08/2023],SCHEDULEDAPPTTIME:[12:30 PM],ESTABLISHEDPATIENT:[T]]

## 2023-04-20 NOTE — ASU DISCHARGE PLAN (ADULT/PEDIATRIC) - NURSING INSTRUCTIONS
OK to take Tylenol/Acetaminophen at 7:15 PM TONIGHT for pain and every 6 hours after as needed. OK to take Motrin/Ibuprofen at 7:30 PM TONIGHT for pain and every 6 hours after as needed.

## 2023-04-20 NOTE — CHART NOTE - NSCHARTNOTEFT_GEN_A_CORE
R2 UroGyn Trial of Void    Patient bladder emptied and instilled with 300cc sterile water. Ambulated to restroom without difficulty, vaginal packing and doty d/c'ed.    Unable to void after 25 minutes. Patient to have doty catheter re-inserted and follow up in the office on Monday, April 24. Patient voiced understand, all questions answered.    d/w attending Dr Ena Ngo  PGY-2 R2 UroGyn Trial of Void    Patient bladder emptied and instilled with 300cc sterile water. Ambulated to restroom without difficulty, vaginal packing and dtoy d/c'ed.    Unable to void after 25 minutes. Patient to have doty catheter re-inserted and follow up in the office on Monday, April 24. Patient voiced understand, all questions answered.    d/w attending Dr Ena Ngo  PGY-2    ADDENDUM:  Patient voided additional 150cc+515cc after initial TOV however with 200+cc still retained. Plan for doty however patient states she does not want doty - counseled patient on risks and benefits as well as need for doty, patient states she understands but still prefers to not have doty. Patient spoke with fellow Veena regarding plan for doty catheter re-insertion and TOV in office tomorrow 4/21 on phone. Patient and nursing staff aware.    Doty catheter to be re-inserted and pt to be d/c'ed with TOV tomorrow in office 4/21.    Gloria Ngo  PGY-2

## 2023-04-20 NOTE — ASU PATIENT PROFILE, ADULT - FALL HARM RISK - UNIVERSAL INTERVENTIONS
Bed in lowest position, wheels locked, appropriate side rails in place/Call bell, personal items and telephone in reach/Instruct patient to call for assistance before getting out of bed or chair/Non-slip footwear when patient is out of bed/[place] to call system/Physically safe environment - no spills, clutter or unnecessary equipment/Purposeful Proactive Rounding/Room/bathroom lighting operational, light cord in reach

## 2023-04-20 NOTE — BRIEF OPERATIVE NOTE - NSICDXBRIEFPROCEDURE_GEN_ALL_CORE_FT
PROCEDURES:  Posterior colporrhaphy 20-Apr-2023 14:21:06  Daisy Curiel  Cystoscopy with urethral sling procedure 20-Apr-2023 14:23:54  Daisy Curiel

## 2023-04-20 NOTE — ASU DISCHARGE PLAN (ADULT/PEDIATRIC) - ASU DC SPECIAL INSTRUCTIONSFT
POSTOPERATIVE INSTRUCTIONS    Bowel regimen:  To avoid constipation and straining, we suggest the following (simultaneously):  1. Colace (stool softener) 100mg, one pill three times a day (breakfast, lunch, dinner). If stool is too soft, decrease to twice a day (breakfast, dinner)  If still constipated, you can add: Miralax once at bedtime  All of these agents can be obtained over the counter at the pharmacy.    Pain control:  For pain control, take the followin.  Motrin 800mg three times a day, take with food  2.  Add Tylenol as needed if still have pain despite Motrin  Motrin and Tylenol can be obtained over the counter.    Postoperative urinary catheter:  If you failed your voiding trial in the hospital and are sent home with a catheter, please call the office ([phone]) so you can come in to have a repeat voiding trial/catheter removal in a few days.    Postoperative restrictions:  Nothing in the vagina (tampons, sexual intercourse), No tub baths, pools or hot tubs for 6 weeks (showers are ok!)  No lifting anything heavier than 10 lbs, no strenuous exercise for 2 weeks after surgery. Do not pull or cut any stitches that you see around your incision.    Vaginal bleeding:  Spotting and intermittent passage of blood clots per vagina is normal in first few weeks after surgery. If you are soaking 1 pad per hour, that is not normal and you should notify my office and seek medical attention right away.    Vaginal discharge:  Vaginal discharge (all colors) is normal after vaginal surgery. If you’ve had vaginal surgery, you have sutures in your vagina which take 3 months to fully absorb. You may have vaginal discharge during this time. This is normal.

## 2023-04-20 NOTE — ASU DISCHARGE PLAN (ADULT/PEDIATRIC) - NS MD DC FALL RISK RISK
For information on Fall & Injury Prevention, visit: [email or web address] OR  [email or web address] OR  https://www.cdc.gov/steadi/patient.html

## 2023-04-21 ENCOUNTER — NON-APPOINTMENT (OUTPATIENT)
Age: 38
End: 2023-04-21

## 2023-04-22 ENCOUNTER — NON-APPOINTMENT (OUTPATIENT)
Age: 38
End: 2023-04-22

## 2023-04-23 ENCOUNTER — NON-APPOINTMENT (OUTPATIENT)
Age: 38
End: 2023-04-23

## 2023-04-24 ENCOUNTER — NON-APPOINTMENT (OUTPATIENT)
Age: 38
End: 2023-04-24

## 2023-04-24 ENCOUNTER — APPOINTMENT (OUTPATIENT)
Dept: UROGYNECOLOGY | Facility: CLINIC | Age: 38
End: 2023-04-24
Payer: COMMERCIAL

## 2023-04-24 PROCEDURE — 99024 POSTOP FOLLOW-UP VISIT: CPT

## 2023-04-24 NOTE — DISCUSSION/SUMMARY
[Post-Op instructions given. Pt/family verbalizes understanding] : post-operative instructions were provided to the patient/family who verbalize understanding [Risks/Benefits discussed. Pt/family verbalizes understanding] : risks and benefits of the procedure were discussed with the patient/family who verbalize understanding [FreeTextEntry1] : Ms. Uriostegui is s/p MUS, cystoscopy, posterior wall repair\par  -Patient reassured that pain at the suture site is expected and to apply ice packs as needed \par -She will followup as scheduled on 5/8/23\par -She was advised to contact the office with further questions/concerns

## 2023-04-24 NOTE — SUBJECTIVE
[FreeTextEntry1] : Presents with pain at perineum [FreeTextEntry8] : Motrin as needed [FreeTextEntry7] : 7/10 at perineal suture site upon movement, walking [FreeTextEntry6] : Good appetite  [FreeTextEntry5] : denies urgency, stress leaks, dysuria or frequency [FreeTextEntry4] : BM regular [FreeTextEntry3] : Ambulating without difficulty  [FreeTextEntry2] : without difficulty [FreeTextEntry9] : light brown vaginal discharge

## 2023-04-24 NOTE — OBJECTIVE
[Post Void Residual ____ ml] : Post Void Residual was [unfilled] ml [Soft and Nontender] : soft and nontender [Clean, Dry, Intact] : Clean, Dry, Intact [Good Support] : Good support [Healing well] : healing well [No Masses or Tenderness] : no masses or tenderness

## 2023-05-08 ENCOUNTER — APPOINTMENT (OUTPATIENT)
Dept: UROGYNECOLOGY | Facility: CLINIC | Age: 38
End: 2023-05-08
Payer: COMMERCIAL

## 2023-05-08 VITALS
HEIGHT: 66 IN | WEIGHT: 130 LBS | BODY MASS INDEX: 20.89 KG/M2 | SYSTOLIC BLOOD PRESSURE: 123 MMHG | HEART RATE: 67 BPM | DIASTOLIC BLOOD PRESSURE: 82 MMHG

## 2023-05-08 PROBLEM — N81.9 FEMALE GENITAL PROLAPSE, UNSPECIFIED: Chronic | Status: ACTIVE | Noted: 2023-04-04

## 2023-05-08 PROBLEM — N39.3 STRESS INCONTINENCE (FEMALE) (MALE): Chronic | Status: ACTIVE | Noted: 2023-04-04

## 2023-05-08 PROCEDURE — 99024 POSTOP FOLLOW-UP VISIT: CPT

## 2023-05-11 NOTE — SUBJECTIVE
[FreeTextEntry7] : Pain controlled; no longer requiring pain medication.  [FreeTextEntry6] : Good appetite.  [FreeTextEntry5] : Denies dysuria, urinary frequency, urgency, incontinence.  [FreeTextEntry4] : BM regular.  [FreeTextEntry3] : Ambulating without difficulty.  [FreeTextEntry2] : Sleeping without difficulty.

## 2023-05-11 NOTE — OBJECTIVE
[Soft and Nontender] : soft and nontender [Clean, Dry, Intact] : Clean, Dry, Intact [Good Support] : Good support [Healing well] : healing well [No Masses or Tenderness] : no masses or tenderness [Voiding Trial] : No voiding trial was performed [FreeTextEntry3] : No mesh tenderness or mesh exposure

## 2023-06-05 ENCOUNTER — APPOINTMENT (OUTPATIENT)
Dept: UROGYNECOLOGY | Facility: CLINIC | Age: 38
End: 2023-06-05
Payer: COMMERCIAL

## 2023-06-05 DIAGNOSIS — Z98.890 OTHER SPECIFIED POSTPROCEDURAL STATES: ICD-10-CM

## 2023-06-05 PROCEDURE — 99024 POSTOP FOLLOW-UP VISIT: CPT

## 2023-07-05 NOTE — H&P PST ADULT - HEIGHT IN FEET
Minoxidil Counseling: Minoxidil is a topical medication which can increase blood flow where it is applied. It is uncertain how this medication increases hair growth. Side effects are uncommon and include stinging and allergic reactions. Simponi Counseling:  I discussed with the patient the risks of golimumab including but not limited to myelosuppression, immunosuppression, autoimmune hepatitis, demyelinating diseases, lymphoma, and serious infections.  The patient understands that monitoring is required including a PPD at baseline and must alert us or the primary physician if symptoms of infection or other concerning signs are noted. Tremfya Pregnancy And Lactation Text: The risk during pregnancy and breastfeeding is uncertain with this medication. Prednisone Counseling:  I discussed with the patient the risks of prolonged use of prednisone including but not limited to weight gain, insomnia, osteoporosis, mood changes, diabetes, susceptibility to infection, glaucoma and high blood pressure.  In cases where prednisone use is prolonged, patients should be monitored with blood pressure checks, serum glucose levels and an eye exam.  Additionally, the patient may need to be placed on GI prophylaxis, PCP prophylaxis, and calcium and vitamin D supplementation and/or a bisphosphonate.  The patient verbalized understanding of the proper use and the possible adverse effects of prednisone.  All of the patient's questions and concerns were addressed. Hydroxychloroquine Pregnancy And Lactation Text: This medication has been shown to cause fetal harm but it isn't assigned a Pregnancy Risk Category. There are small amounts excreted in breast milk. Bexarotene Counseling:  I discussed with the patient the risks of bexarotene including but not limited to hair loss, dry lips/skin/eyes, liver abnormalities, hyperlipidemia, pancreatitis, depression/suicidal ideation, photosensitivity, drug rash/allergic reactions, hypothyroidism, anemia, leukopenia, infection, cataracts, and teratogenicity.  Patient understands that they will need regular blood tests to check lipid profile, liver function tests, white blood cell count, thyroid function tests and pregnancy test if applicable. Drysol Pregnancy And Lactation Text: This medication is considered safe during pregnancy and breast feeding. Cephalexin Pregnancy And Lactation Text: This medication is Pregnancy Category B and considered safe during pregnancy.  It is also excreted in breast milk but can be used safely for shorter doses. Odomzo Pregnancy And Lactation Text: This medication is Pregnancy Category X and is absolutely contraindicated during pregnancy. It is unknown if it is excreted in breast milk. Humira Pregnancy And Lactation Text: This medication is Pregnancy Category B and is considered safe during pregnancy. It is unknown if this medication is excreted in breast milk. Azathioprine Pregnancy And Lactation Text: This medication is Pregnancy Category D and isn't considered safe during pregnancy. It is unknown if this medication is excreted in breast milk. Winlevi Counseling:  I discussed with the patient the risks of topical clascoterone including but not limited to erythema, scaling, itching, and stinging. Patient voiced their understanding. Colchicine Counseling:  Patient counseled regarding adverse effects including but not limited to stomach upset (nausea, vomiting, stomach pain, or diarrhea).  Patient instructed to limit alcohol consumption while taking this medication.  Colchicine may reduce blood counts especially with prolonged use.  The patient understands that monitoring of kidney function and blood counts may be required, especially at baseline. The patient verbalized understanding of the proper use and possible adverse effects of colchicine.  All of the patient's questions and concerns were addressed. Tetracycline Counseling: Patient counseled regarding possible photosensitivity and increased risk for sunburn.  Patient instructed to avoid sunlight, if possible.  When exposed to sunlight, patients should wear protective clothing, sunglasses, and sunscreen.  The patient was instructed to call the office immediately if the following severe adverse effects occur:  hearing changes, easy bruising/bleeding, severe headache, or vision changes.  The patient verbalized understanding of the proper use and possible adverse effects of tetracycline.  All of the patient's questions and concerns were addressed. Patient understands to avoid pregnancy while on therapy due to potential birth defects. Winlevi Pregnancy And Lactation Text: This medication is considered safe during pregnancy and breastfeeding. Sski Pregnancy And Lactation Text: This medication is Pregnancy Category D and isn't considered safe during pregnancy. It is excreted in breast milk. Ilumya Counseling: I discussed with the patient the risks of tildrakizumab including but not limited to immunosuppression, malignancy, posterior leukoencephalopathy syndrome, and serious infections.  The patient understands that monitoring is required including a PPD at baseline and must alert us or the primary physician if symptoms of infection or other concerning signs are noted. Ketoconazole Pregnancy And Lactation Text: This medication is Pregnancy Category C and it isn't know if it is safe during pregnancy. It is also excreted in breast milk and breast feeding isn't recommended. Tetracycline Pregnancy And Lactation Text: This medication is Pregnancy Category D and not consider safe during pregnancy. It is also excreted in breast milk. Hydroxyzine Pregnancy And Lactation Text: This medication is not safe during pregnancy and should not be taken. It is also excreted in breast milk and breast feeding isn't recommended. Benzoyl Peroxide Pregnancy And Lactation Text: This medication is Pregnancy Category C. It is unknown if benzoyl peroxide is excreted in breast milk. Topical Clindamycin Counseling: Patient counseled that this medication may cause skin irritation or allergic reactions.  In the event of skin irritation, the patient was advised to reduce the amount of the drug applied or use it less frequently.   The patient verbalized understanding of the proper use and possible adverse effects of clindamycin.  All of the patient's questions and concerns were addressed. Protopic Pregnancy And Lactation Text: This medication is Pregnancy Category C. It is unknown if this medication is excreted in breast milk when applied topically. Cimzia Pregnancy And Lactation Text: This medication crosses the placenta but can be considered safe in certain situations. Cimzia may be excreted in breast milk. Minocycline Counseling: Patient advised regarding possible photosensitivity and discoloration of the teeth, skin, lips, tongue and gums.  Patient instructed to avoid sunlight, if possible.  When exposed to sunlight, patients should wear protective clothing, sunglasses, and sunscreen.  The patient was instructed to call the office immediately if the following severe adverse effects occur:  hearing changes, easy bruising/bleeding, severe headache, or vision changes.  The patient verbalized understanding of the proper use and possible adverse effects of minocycline.  All of the patient's questions and concerns were addressed. Oral Minoxidil Counseling- I discussed with the patient the risks of oral minoxidil including but not limited to shortness of breath, swelling of the feet or ankles, dizziness, lightheadedness, unwanted hair growth and allergic reaction.  The patient verbalized understanding of the proper use and possible adverse effects of oral minoxidil.  All of the patient's questions and concerns were addressed. Xolair Counseling:  Patient informed of potential adverse effects including but not limited to fever, muscle aches, rash and allergic reactions.  The patient verbalized understanding of the proper use and possible adverse effects of Xolair.  All of the patient's questions and concerns were addressed. Rinvoq Pregnancy And Lactation Text: Based on animal studies, Rinvoq may cause embryo-fetal harm when administered to pregnant women.  The medication should not be used in pregnancy.  Breastfeeding is not recommended during treatment and for 6 days after the last dose. Rhofade Counseling: Rhofade is a topical medication which can decrease superficial blood flow where applied. Side effects are uncommon and include stinging, redness and allergic reactions. Prednisone Pregnancy And Lactation Text: This medication is Pregnancy Category C and it isn't know if it is safe during pregnancy. This medication is excreted in breast milk. Oral Minoxidil Pregnancy And Lactation Text: This medication should only be used when clearly needed if you are pregnant, attempting to become pregnant or breast feeding. Detail Level: Detailed Clindamycin Counseling: I counseled the patient regarding use of clindamycin as an antibiotic for prophylactic and/or therapeutic purposes. Clindamycin is active against numerous classes of bacteria, including skin bacteria. Side effects may include nausea, diarrhea, gastrointestinal upset, rash, hives, yeast infections, and in rare cases, colitis. Elidel Counseling: Patient may experience a mild burning sensation during topical application. Elidel is not approved in children less than 2 years of age. There have been case reports of hematologic and skin malignancies in patients using topical calcineurin inhibitors although causality is questionable. Cellcept Counseling:  I discussed with the patient the risks of mycophenolate mofetil including but not limited to infection/immunosuppression, GI upset, hypokalemia, hypercholesterolemia, bone marrow suppression, lymphoproliferative disorders, malignancy, GI ulceration/bleed/perforation, colitis, interstitial lung disease, kidney failure, progressive multifocal leukoencephalopathy, and birth defects.  The patient understands that monitoring is required including a baseline creatinine and regular CBC testing. In addition, patient must alert us immediately if symptoms of infection or other concerning signs are noted. Minoxidil Pregnancy And Lactation Text: This medication has not been assigned a Pregnancy Risk Category but animal studies failed to show danger with the topical medication. It is unknown if the medication is excreted in breast milk. Low Dose Naltrexone Counseling- I discussed with the patient the potential risks and side effects of low dose naltrexone including but not limited to: more vivid dreams, headaches, nausea, vomiting, abdominal pain, fatigue, dizziness, and anxiety. Bexarotene Pregnancy And Lactation Text: This medication is Pregnancy Category X and should not be given to women who are pregnant or may become pregnant. This medication should not be used if you are breast feeding. Colchicine Pregnancy And Lactation Text: This medication is Pregnancy Category C and isn't considered safe during pregnancy. It is excreted in breast milk. Zyclara Counseling:  I discussed with the patient the risks of imiquimod including but not limited to erythema, scaling, itching, weeping, crusting, and pain.  Patient understands that the inflammatory response to imiquimod is variable from person to person and was educated regarded proper titration schedule.  If flu-like symptoms develop, patient knows to discontinue the medication and contact us. Carac Counseling:  I discussed with the patient the risks of Carac including but not limited to erythema, scaling, itching, weeping, crusting, and pain. Isotretinoin Counseling: Patient should get monthly blood tests, not donate blood, not drive at night if vision affected, not share medication, and not undergo elective surgery for 6 months after tx completed. Side effects reviewed, pt to contact office should one occur. Terbinafine Counseling: Patient counseling regarding adverse effects of terbinafine including but not limited to headache, diarrhea, rash, upset stomach, liver function test abnormalities, itching, taste/smell disturbance, nausea, abdominal pain, and flatulence.  There is a rare possibility of liver failure that can occur when taking terbinafine.  The patient understands that a baseline LFT and kidney function test may be required. The patient verbalized understanding of the proper use and possible adverse effects of terbinafine.  All of the patient's questions and concerns were addressed. Clindamycin Pregnancy And Lactation Text: This medication can be used in pregnancy if certain situations. Clindamycin is also present in breast milk. Skyrizi Counseling: I discussed with the patient the risks of risankizumab-rzaa including but not limited to immunosuppression, and serious infections.  The patient understands that monitoring is required including a PPD at baseline and must alert us or the primary physician if symptoms of infection or other concerning signs are noted. Topical Clindamycin Pregnancy And Lactation Text: This medication is Pregnancy Category B and is considered safe during pregnancy. It is unknown if it is excreted in breast milk. Dutasteride Male Counseling: Dustasteride Counseling:  I discussed with the patient the risks of use of dutasteride including but not limited to decreased libido, decreased ejaculate volume, and gynecomastia. Women who can become pregnant should not handle medication.  All of the patient's questions and concerns were addressed. Thalidomide Counseling: I discussed with the patient the risks of thalidomide including but not limited to birth defects, anxiety, weakness, chest pain, dizziness, cough and severe allergy. Cosentyx Counseling:  I discussed with the patient the risks of Cosentyx including but not limited to worsening of Crohn's disease, immunosuppression, allergic reactions and infections.  The patient understands that monitoring is required including a PPD at baseline and must alert us or the primary physician if symptoms of infection or other concerning signs are noted. Fluconazole Counseling:  Patient counseled regarding adverse effects of fluconazole including but not limited to headache, diarrhea, nausea, upset stomach, liver function test abnormalities, taste disturbance, and stomach pain.  There is a rare possibility of liver failure that can occur when taking fluconazole.  The patient understands that monitoring of LFTs and kidney function test may be required, especially at baseline. The patient verbalized understanding of the proper use and possible adverse effects of fluconazole.  All of the patient's questions and concerns were addressed. Rhofade Pregnancy And Lactation Text: This medication has not been assigned a Pregnancy Risk Category. It is unknown if the medication is excreted in breast milk. Otezla Counseling: The side effects of Otezla were discussed with the patient, including but not limited to worsening or new depression, weight loss, diarrhea, nausea, upper respiratory tract infection, and headache. Patient instructed to call the office should any adverse effect occur.  The patient verbalized understanding of the proper use and possible adverse effects of Otezla.  All the patient's questions and concerns were addressed. Dutasteride Pregnancy And Lactation Text: This medication is absolutely contraindicated in women, especially during pregnancy and breast feeding. Feminization of male fetuses is possible if taking while pregnant. Quinolones Counseling:  I discussed with the patient the risks of fluoroquinolones including but not limited to GI upset, allergic reaction, drug rash, diarrhea, dizziness, photosensitivity, yeast infections, liver function test abnormalities, tendonitis/tendon rupture. Albendazole Counseling:  I discussed with the patient the risks of albendazole including but not limited to cytopenia, kidney damage, nausea/vomiting and severe allergy.  The patient understands that this medication is being used in an off-label manner. Dapsone Counseling: I discussed with the patient the risks of dapsone including but not limited to hemolytic anemia, agranulocytosis, rashes, methemoglobinemia, kidney failure, peripheral neuropathy, headaches, GI upset, and liver toxicity.  Patients who start dapsone require monitoring including baseline LFTs and weekly CBCs for the first month, then every month thereafter.  The patient verbalized understanding of the proper use and possible adverse effects of dapsone.  All of the patient's questions and concerns were addressed. Mirvaso Counseling: Mirvaso is a topical medication which can decrease superficial blood flow where applied. Side effects are uncommon and include stinging, redness and allergic reactions. Elidel Pregnancy And Lactation Text: This medication is Pregnancy Category C. It is unknown if this medication is excreted in breast milk. Low Dose Naltrexone Pregnancy And Lactation Text: Naltrexone is pregnancy category C.  There have been no adequate and well-controlled studies in pregnant women.  It should be used in pregnancy only if the potential benefit justifies the potential risk to the fetus.   Limited data indicates that naltrexone is minimally excreted into breastmilk. Xolair Pregnancy And Lactation Text: This medication is Pregnancy Category B and is considered safe during pregnancy. This medication is excreted in breast milk. Sotyktu Counseling:  I discussed the most common side effects of Sotyktu including: common cold, sore throat, sinus infections, cold sores, canker sores, folliculitis, and acne.  I also discussed more serious side effects of Sotyktu including but not limited to: serious allergic reactions; increased risk for infections such as TB; cancers such as lymphomas; rhabdomyolysis and elevated CPK; and elevated triglycerides and liver enzymes.  Opioid Counseling: I discussed with the patient the potential side effects of opioids including but not limited to addiction, altered mental status, and depression. I stressed avoiding alcohol, benzodiazepines, muscle relaxants and sleep aids unless specifically okayed by a physician. The patient verbalized understanding of the proper use and possible adverse effects of opioids. All of the patient's questions and concerns were addressed. They were instructed to flush the remaining pills down the toilet if they did not need them for pain. Dapsone Pregnancy And Lactation Text: This medication is Pregnancy Category C and is not considered safe during pregnancy or breast feeding. Carac Pregnancy And Lactation Text: This medication is Pregnancy Category X and contraindicated in pregnancy and in women who may become pregnant. It is unknown if this medication is excreted in breast milk. Isotretinoin Pregnancy And Lactation Text: This medication is Pregnancy Category X and is considered extremely dangerous during pregnancy. It is unknown if it is excreted in breast milk. Doxycycline Counseling:  Patient counseled regarding possible photosensitivity and increased risk for sunburn.  Patient instructed to avoid sunlight, if possible.  When exposed to sunlight, patients should wear protective clothing, sunglasses, and sunscreen.  The patient was instructed to call the office immediately if the following severe adverse effects occur:  hearing changes, easy bruising/bleeding, severe headache, or vision changes.  The patient verbalized understanding of the proper use and possible adverse effects of doxycycline.  All of the patient's questions and concerns were addressed. Terbinafine Pregnancy And Lactation Text: This medication is Pregnancy Category B and is considered safe during pregnancy. It is also excreted in breast milk and breast feeding isn't recommended. Eucrisa Counseling: Patient may experience a mild burning sensation during topical application. Eucrisa is not approved in children less than 3 months of age. Cyclophosphamide Counseling:  I discussed with the patient the risks of cyclophosphamide including but not limited to hair loss, hormonal abnormalities, decreased fertility, abdominal pain, diarrhea, nausea and vomiting, bone marrow suppression and infection. The patient understands that monitoring is required while taking this medication. Topical Ketoconazole Counseling: Patient counseled that this medication may cause skin irritation or allergic reactions.  In the event of skin irritation, the patient was advised to reduce the amount of the drug applied or use it less frequently.   The patient verbalized understanding of the proper use and possible adverse effects of ketoconazole.  All of the patient's questions and concerns were addressed. Infliximab Counseling:  I discussed with the patient the risks of infliximab including but not limited to myelosuppression, immunosuppression, autoimmune hepatitis, demyelinating diseases, lymphoma, and serious infections.  The patient understands that monitoring is required including a PPD at baseline and must alert us or the primary physician if symptoms of infection or other concerning signs are noted. Cibinqo Counseling: I discussed with the patient the risks of Cibinqo therapy including but not limited to common cold, nausea, headache, cold sores, increased blood CPK levels, dizziness, UTIs, fatigue, acne, and vomitting. Live vaccines should be avoided.  This medication has been linked to serious infections; higher rate of mortality; malignancy and lymphoproliferative disorders; major adverse cardiovascular events; thrombosis; thrombocytopenia and lymphopenia; lipid elevations; and retinal detachment. Quinolones Pregnancy And Lactation Text: This medication is Pregnancy Category C and it isn't know if it is safe during pregnancy. It is also excreted in breast milk. Azithromycin Counseling:  I discussed with the patient the risks of azithromycin including but not limited to GI upset, allergic reaction, drug rash, diarrhea, and yeast infections. Otezla Pregnancy And Lactation Text: This medication is Pregnancy Category C and it isn't known if it is safe during pregnancy. It is unknown if it is excreted in breast milk. Include Pregnancy/Lactation Warning?: No Solaraze Counseling:  I discussed with the patient the risks of Solaraze including but not limited to erythema, scaling, itching, weeping, crusting, and pain. Dupixent Counseling: I discussed with the patient the risks of dupilumab including but not limited to eye infection and irritation, cold sores, injection site reactions, worsening of asthma, allergic reactions and increased risk of parasitic infection.  Live vaccines should be avoided while taking dupilumab. Dupilumab will also interact with certain medications such as warfarin and cyclosporine. The patient understands that monitoring is required and they must alert us or the primary physician if symptoms of infection or other concerning signs are noted. Finasteride Male Counseling: Finasteride Counseling:  I discussed with the patient the risks of use of finasteride including but not limited to decreased libido, decreased ejaculate volume, gynecomastia, and depression. Women should not handle medication.  All of the patient's questions and concerns were addressed. Niacinamide Counseling: I recommended taking niacin or niacinamide, also know as vitamin B3, twice daily. Recent evidence suggests that taking vitamin B3 (500 mg twice daily) can reduce the risk of actinic keratoses and non-melanoma skin cancers. Side effects of vitamin B3 include flushing and headache. Opioid Pregnancy And Lactation Text: These medications can lead to premature delivery and should be avoided during pregnancy. These medications are also present in breast milk in small amounts. Albendazole Pregnancy And Lactation Text: This medication is Pregnancy Category C and it isn't known if it is safe during pregnancy. It is also excreted in breast milk. Sotyktu Pregnancy And Lactation Text: There is insufficient data to evaluate whether or not Sotyktu is safe to use during pregnancy.   It is not known if Sotyktu passes into breast milk and whether or not it is safe to use when breastfeeding.   Gabapentin Counseling: I discussed with the patient the risks of gabapentin including but not limited to dizziness, somnolence, fatigue and ataxia. High Dose Vitamin A Counseling: Side effects reviewed, pt to contact office should one occur. Doxycycline Pregnancy And Lactation Text: This medication is Pregnancy Category D and not consider safe during pregnancy. It is also excreted in breast milk but is considered safe for shorter treatment courses. Stelara Counseling:  I discussed with the patient the risks of ustekinumab including but not limited to immunosuppression, malignancy, posterior leukoencephalopathy syndrome, and serious infections.  The patient understands that monitoring is required including a PPD at baseline and must alert us or the primary physician if symptoms of infection or other concerning signs are noted. Xeljanz Counseling: I discussed with the patient the risks of Xeljanz therapy including increased risk of infection, liver issues, headache, diarrhea, or cold symptoms. Live vaccines should be avoided. They were instructed to call if they have any problems. Cyclophosphamide Pregnancy And Lactation Text: This medication is Pregnancy Category D and it isn't considered safe during pregnancy. This medication is excreted in breast milk. Erivedge Counseling- I discussed with the patient the risks of Erivedge including but not limited to nausea, vomiting, diarrhea, constipation, weight loss, changes in the sense of taste, decreased appetite, muscle spasms, and hair loss.  The patient verbalized understanding of the proper use and possible adverse effects of Erivedge.  All of the patient's questions and concerns were addressed. 5 Cibinqo Pregnancy And Lactation Text: It is unknown if this medication will adversely affect pregnancy or breast feeding.  You should not take this medication if you are currently pregnant or planning a pregnancy or while breastfeeding. Tranexamic Acid Counseling:  Patient advised of the small risk of bleeding problems with tranexamic acid. They were also instructed to call if they developed any nausea, vomiting or diarrhea. All of the patient's questions and concerns were addressed. Calcipotriene Counseling:  I discussed with the patient the risks of calcipotriene including but not limited to erythema, scaling, itching, and irritation. Dupixent Pregnancy And Lactation Text: This medication likely crosses the placenta but the risk for the fetus is uncertain. This medication is excreted in breast milk. Tranexamic Acid Pregnancy And Lactation Text: It is unknown if this medication is safe during pregnancy or breast feeding. Calcipotriene Pregnancy And Lactation Text: This medication has not been proven safe during pregnancy. It is unknown if this medication is excreted in breast milk. Solaraze Pregnancy And Lactation Text: This medication is Pregnancy Category B and is considered safe. There is some data to suggest avoiding during the third trimester. It is unknown if this medication is excreted in breast milk. Topical Sulfur Applications Counseling: Topical Sulfur Counseling: Patient counseled that this medication may cause skin irritation or allergic reactions.  In the event of skin irritation, the patient was advised to reduce the amount of the drug applied or use it less frequently.   The patient verbalized understanding of the proper use and possible adverse effects of topical sulfur application.  All of the patient's questions and concerns were addressed. Arava Counseling:  Patient counseled regarding adverse effects of Arava including but not limited to nausea, vomiting, abnormalities in liver function tests. Patients may develop mouth sores, rash, diarrhea, and abnormalities in blood counts. The patient understands that monitoring is required including LFTs and blood counts.  There is a rare possibility of scarring of the liver and lung problems that can occur when taking methotrexate. Persistent nausea, loss of appetite, pale stools, dark urine, cough, and shortness of breath should be reported immediately. Patient advised to discontinue Arava treatment and consult with a physician prior to attempting conception. The patient will have to undergo a treatment to eliminate Arava from the body prior to conception. Azithromycin Pregnancy And Lactation Text: This medication is considered safe during pregnancy and is also secreted in breast milk. Niacinamide Pregnancy And Lactation Text: These medications are considered safe during pregnancy. Rifampin Counseling: I discussed with the patient the risks of rifampin including but not limited to liver damage, kidney damage, red-orange body fluids, nausea/vomiting and severe allergy. Griseofulvin Counseling:  I discussed with the patient the risks of griseofulvin including but not limited to photosensitivity, cytopenia, liver damage, nausea/vomiting and severe allergy.  The patient understands that this medication is best absorbed when taken with a fatty meal (e.g., ice cream or french fries). Cimetidine Counseling:  I discussed with the patient the risks of Cimetidine including but not limited to gynecomastia, headache, diarrhea, nausea, drowsiness, arrhythmias, pancreatitis, skin rashes, psychosis, bone marrow suppression and kidney toxicity. Finasteride Pregnancy And Lactation Text: This medication is absolutely contraindicated during pregnancy. It is unknown if it is excreted in breast milk. Ivermectin Counseling:  Patient instructed to take medication on an empty stomach with a full glass of water.  Patient informed of potential adverse effects including but not limited to nausea, diarrhea, dizziness, itching, and swelling of the extremities or lymph nodes.  The patient verbalized understanding of the proper use and possible adverse effects of ivermectin.  All of the patient's questions and concerns were addressed. Oxybutynin Counseling:  I discussed with the patient the risks of oxybutynin including but not limited to skin rash, drowsiness, dry mouth, difficulty urinating, and blurred vision. Opzelura Counseling:  I discussed with the patient the risks of Opzelura including but not limited to nasopharngitis, bronchitis, ear infection, eosinophila, hives, diarrhea, folliculitis, tonsillitis, and rhinorrhea.  Taken orally, this medication has been linked to serious infections; higher rate of mortality; malignancy and lymphoproliferative disorders; major adverse cardiovascular events; thrombosis; thrombocytopenia, anemia, and neutropenia; and lipid elevations. Opzelura Pregnancy And Lactation Text: There is insufficient data to evaluate drug-associated risk for major birth defects, miscarriage, or other adverse maternal or fetal outcomes.  There is a pregnancy registry that monitors pregnancy outcomes in pregnant persons exposed to the medication during pregnancy.  It is unknown if this medication is excreted in breast milk.  Do not breastfeed during treatment and for about 4 weeks after the last dose. Erythromycin Counseling:  I discussed with the patient the risks of erythromycin including but not limited to GI upset, allergic reaction, drug rash, diarrhea, increase in liver enzymes, and yeast infections. Nsaids Counseling: NSAID Counseling: I discussed with the patient that NSAIDs should be taken with food. Prolonged use of NSAIDs can result in the development of stomach ulcers.  Patient advised to stop taking NSAIDs if abdominal pain occurs.  The patient verbalized understanding of the proper use and possible adverse effects of NSAIDs.  All of the patient's questions and concerns were addressed. Hydroquinone Counseling:  Patient advised that medication may result in skin irritation, lightening (hypopigmentation), dryness, and burning.  In the event of skin irritation, the patient was advised to reduce the amount of the drug applied or use it less frequently.  Rarely, spots that are treated with hydroquinone can become darker (pseudoochronosis).  Should this occur, patient instructed to stop medication and call the office. The patient verbalized understanding of the proper use and possible adverse effects of hydroquinone.  All of the patient's questions and concerns were addressed. Olumiant Counseling: I discussed with the patient the risks of Olumiant therapy including but not limited to upper respiratory tract infections, shingles, cold sores, and nausea. Live vaccines should be avoided.  This medication has been linked to serious infections; higher rate of mortality; malignancy and lymphoproliferative disorders; major adverse cardiovascular events; thrombosis; gastrointestinal perforations; neutropenia; lymphopenia; anemia; liver enzyme elevations; and lipid elevations. Rituxan Counseling:  I discussed with the patient the risks of Rituxan infusions. Side effects can include infusion reactions, severe drug rashes including mucocutaneous reactions, reactivation of latent hepatitis and other infections and rarely progressive multifocal leukoencephalopathy.  All of the patient's questions and concerns were addressed. Cyclosporine Counseling:  I discussed with the patient the risks of cyclosporine including but not limited to hypertension, gingival hyperplasia,myelosuppression, immunosuppression, liver damage, kidney damage, neurotoxicity, lymphoma, and serious infections. The patient understands that monitoring is required including baseline blood pressure, CBC, CMP, lipid panel and uric acid, and then 1-2 times monthly CMP and blood pressure. High Dose Vitamin A Pregnancy And Lactation Text: High dose vitamin A therapy is contraindicated during pregnancy and breast feeding. Topical Sulfur Applications Pregnancy And Lactation Text: This medication is considered safe during pregnancy and breast feeding secondary to limited systemic absorption. Griseofulvin Pregnancy And Lactation Text: This medication is Pregnancy Category X and is known to cause serious birth defects. It is unknown if this medication is excreted in breast milk but breast feeding should be avoided. Libtayo Counseling- I discussed with the patient the risks of Libtayo including but not limited to nausea, vomiting, diarrhea, and bone or muscle pain.  The patient verbalized understanding of the proper use and possible adverse effects of Libtayo.  All of the patient's questions and concerns were addressed. 5-Fu Counseling: 5-Fluorouracil Counseling:  I discussed with the patient the risks of 5-fluorouracil including but not limited to erythema, scaling, itching, weeping, crusting, and pain. Rituxan Pregnancy And Lactation Text: This medication is Pregnancy Category C and it isn't know if it is safe during pregnancy. It is unknown if this medication is excreted in breast milk but similar antibodies are known to be excreted. Valtrex Counseling: I discussed with the patient the risks of valacyclovir including but not limited to kidney damage, nausea, vomiting and severe allergy.  The patient understands that if the infection seems to be worsening or is not improving, they are to call. Olumiant Pregnancy And Lactation Text: Based on animal studies, Olumiant may cause embryo-fetal harm when administered to pregnant women.  The medication should not be used in pregnancy.  Breastfeeding is not recommended during treatment. Birth Control Pills Counseling: Birth Control Pill Counseling: I discussed with the patient the potential side effects of OCPs including but not limited to increased risk of stroke, heart attack, thrombophlebitis, deep venous thrombosis, hepatic adenomas, breast changes, GI upset, headaches, and depression.  The patient verbalized understanding of the proper use and possible adverse effects of OCPs. All of the patient's questions and concerns were addressed. Topical Retinoid counseling:  Patient advised to apply a pea-sized amount only at bedtime and wait 30 minutes after washing their face before applying.  If too drying, patient may add a non-comedogenic moisturizer. The patient verbalized understanding of the proper use and possible adverse effects of retinoids.  All of the patient's questions and concerns were addressed. Rifampin Pregnancy And Lactation Text: This medication is Pregnancy Category C and it isn't know if it is safe during pregnancy. It is also excreted in breast milk and should not be used if you are breast feeding. Xelmanez Pregnancy And Lactation Text: This medication is Pregnancy Category D and is not considered safe during pregnancy.  The risk during breast feeding is also uncertain. Bactrim Counseling:  I discussed with the patient the risks of sulfa antibiotics including but not limited to GI upset, allergic reaction, drug rash, diarrhea, dizziness, photosensitivity, and yeast infections.  Rarely, more serious reactions can occur including but not limited to aplastic anemia, agranulocytosis, methemoglobinemia, blood dyscrasias, liver or kidney failure, lung infiltrates or desquamative/blistering drug rashes. Enbrel Counseling:  I discussed with the patient the risks of etanercept including but not limited to myelosuppression, immunosuppression, autoimmune hepatitis, demyelinating diseases, lymphoma, and infections.  The patient understands that monitoring is required including a PPD at baseline and must alert us or the primary physician if symptoms of infection or other concerning signs are noted. Picato Counseling:  I discussed with the patient the risks of Picato including but not limited to erythema, scaling, itching, weeping, crusting, and pain. Nsaids Pregnancy And Lactation Text: These medications are considered safe up to 30 weeks gestation. It is excreted in breast milk. Spironolactone Pregnancy And Lactation Text: This medication can cause feminization of the male fetus and should be avoided during pregnancy. The active metabolite is also found in breast milk. Propranolol Counseling:  I discussed with the patient the risks of propranolol including but not limited to low heart rate, low blood pressure, low blood sugar, restlessness and increased cold sensitivity. They should call the office if they experience any of these side effects. Erythromycin Pregnancy And Lactation Text: This medication is Pregnancy Category B and is considered safe during pregnancy. It is also excreted in breast milk. Taltz Counseling: I discussed with the patient the risks of ixekizumab including but not limited to immunosuppression, serious infections, worsening of inflammatory bowel disease and drug reactions.  The patient understands that monitoring is required including a PPD at baseline and must alert us or the primary physician if symptoms of infection or other concerning signs are noted. Glycopyrrolate Counseling:  I discussed with the patient the risks of glycopyrrolate including but not limited to skin rash, drowsiness, dry mouth, difficulty urinating, and blurred vision. Libtayo Pregnancy And Lactation Text: This medication is contraindicated in pregnancy and when breast feeding. Acitretin Counseling:  I discussed with the patient the risks of acitretin including but not limited to hair loss, dry lips/skin/eyes, liver damage, hyperlipidemia, depression/suicidal ideation, photosensitivity.  Serious rare side effects can include but are not limited to pancreatitis, pseudotumor cerebri, bony changes, clot formation/stroke/heart attack.  Patient understands that alcohol is contraindicated since it can result in liver toxicity and significantly prolong the elimination of the drug by many years. Clofazimine Counseling:  I discussed with the patient the risks of clofazimine including but not limited to skin and eye pigmentation, liver damage, nausea/vomiting, gastrointestinal bleeding and allergy. Siliq Counseling:  I discussed with the patient the risks of Siliq including but not limited to new or worsening depression, suicidal thoughts and behavior, immunosuppression, malignancy, posterior leukoencephalopathy syndrome, and serious infections.  The patient understands that monitoring is required including a PPD at baseline and must alert us or the primary physician if symptoms of infection or other concerning signs are noted. There is also a special program designed to monitor depression which is required with Siliq. Birth Control Pills Pregnancy And Lactation Text: This medication should be avoided if pregnant and for the first 30 days post-partum. Azelaic Acid Counseling: Patient counseled that medicine may cause skin irritation and to avoid applying near the eyes.  In the event of skin irritation, the patient was advised to reduce the amount of the drug applied or use it less frequently.   The patient verbalized understanding of the proper use and possible adverse effects of azelaic acid.  All of the patient's questions and concerns were addressed. Wartpeel Counseling:  I discussed with the patient the risks of Wartpeel including but not limited to erythema, scaling, itching, weeping, crusting, and pain. Bactrim Pregnancy And Lactation Text: This medication is Pregnancy Category D and is known to cause fetal risk.  It is also excreted in breast milk. Rinvoq Counseling: I discussed with the patient the risks of Rinvoq therapy including but not limited to upper respiratory tract infections, shingles, cold sores, bronchitis, nausea, cough, fever, acne, and headache. Live vaccines should be avoided.  This medication has been linked to serious infections; higher rate of mortality; malignancy and lymphoproliferative disorders; major adverse cardiovascular events; thrombosis; thrombocytopenia, anemia, and neutropenia; lipid elevations; liver enzyme elevations; and gastrointestinal perforations. Valtrex Pregnancy And Lactation Text: this medication is Pregnancy Category B and is considered safe during pregnancy. This medication is not directly found in breast milk but it's metabolite acyclovir is present. Sarecycline Counseling: Patient advised regarding possible photosensitivity and discoloration of the teeth, skin, lips, tongue and gums.  Patient instructed to avoid sunlight, if possible.  When exposed to sunlight, patients should wear protective clothing, sunglasses, and sunscreen.  The patient was instructed to call the office immediately if the following severe adverse effects occur:  hearing changes, easy bruising/bleeding, severe headache, or vision changes.  The patient verbalized understanding of the proper use and possible adverse effects of sarecycline.  All of the patient's questions and concerns were addressed. Itraconazole Counseling:  I discussed with the patient the risks of itraconazole including but not limited to liver damage, nausea/vomiting, neuropathy, and severe allergy.  The patient understands that this medication is best absorbed when taken with acidic beverages such as non-diet cola or ginger ale.  The patient understands that monitoring is required including baseline LFTs and repeat LFTs at intervals.  The patient understands that they are to contact us or the primary physician if concerning signs are noted. Doxepin Counseling:  Patient advised that the medication is sedating and not to drive a car after taking this medication. Patient informed of potential adverse effects including but not limited to dry mouth, urinary retention, and blurry vision.  The patient verbalized understanding of the proper use and possible adverse effects of doxepin.  All of the patient's questions and concerns were addressed. Doxepin Pregnancy And Lactation Text: This medication is Pregnancy Category C and it isn't known if it is safe during pregnancy. It is also excreted in breast milk and breast feeding isn't recommended. Adbry Pregnancy And Lactation Text: It is unknown if this medication will adversely affect pregnancy or breast feeding. Olanzapine Counseling- I discussed with the patient the common side effects of olanzapine including but are not limited to: lack of energy, dry mouth, increased appetite, sleepiness, tremor, constipation, dizziness, changes in behavior, or restlessness.  Explained that teenagers are more likely to experience headaches, abdominal pain, pain in the arms or legs, tiredness, and sleepiness.  Serious side effects include but are not limited: increased risk of death in elderly patients who are confused, have memory loss, or dementia-related psychosis; hyperglycemia; increased cholesterol and triglycerides; and weight gain. Spironolactone Counseling: Patient advised regarding risks of diarrhea, abdominal pain, hyperkalemia, birth defects (for female patients), liver toxicity and renal toxicity. The patient may need blood work to monitor liver and kidney function and potassium levels while on therapy. The patient verbalized understanding of the proper use and possible adverse effects of spironolactone.  All of the patient's questions and concerns were addressed. Metronidazole Counseling:  I discussed with the patient the risks of metronidazole including but not limited to seizures, nausea/vomiting, a metallic taste in the mouth, nausea/vomiting and severe allergy. Methotrexate Counseling:  Patient counseled regarding adverse effects of methotrexate including but not limited to nausea, vomiting, abnormalities in liver function tests. Patients may develop mouth sores, rash, diarrhea, and abnormalities in blood counts. The patient understands that monitoring is required including LFT's and blood counts.  There is a rare possibility of scarring of the liver and lung problems that can occur when taking methotrexate. Persistent nausea, loss of appetite, pale stools, dark urine, cough, and shortness of breath should be reported immediately. Patient advised to discontinue methotrexate treatment at least three months before attempting to become pregnant.  I discussed the need for folate supplements while taking methotrexate.  These supplements can decrease side effects during methotrexate treatment. The patient verbalized understanding of the proper use and possible adverse effects of methotrexate.  All of the patient's questions and concerns were addressed. Glycopyrrolate Pregnancy And Lactation Text: This medication is Pregnancy Category B and is considered safe during pregnancy. It is unknown if it is excreted breast milk. Imiquimod Counseling:  I discussed with the patient the risks of imiquimod including but not limited to erythema, scaling, itching, weeping, crusting, and pain.  Patient understands that the inflammatory response to imiquimod is variable from person to person and was educated regarded proper titration schedule.  If flu-like symptoms develop, patient knows to discontinue the medication and contact us. Hydroxychloroquine Counseling:  I discussed with the patient that a baseline ophthalmologic exam is needed at the start of therapy and every year thereafter while on therapy. A CBC may also be warranted for monitoring.  The side effects of this medication were discussed with the patient, including but not limited to agranulocytosis, aplastic anemia, seizures, rashes, retinopathy, and liver toxicity. Patient instructed to call the office should any adverse effect occur.  The patient verbalized understanding of the proper use and possible adverse effects of Plaquenil.  All the patient's questions and concerns were addressed. Acitretin Pregnancy And Lactation Text: This medication is Pregnancy Category X and should not be given to women who are pregnant or may become pregnant in the future. This medication is excreted in breast milk. Odomzo Counseling- I discussed with the patient the risks of Odomzo including but not limited to nausea, vomiting, diarrhea, constipation, weight loss, changes in the sense of taste, decreased appetite, muscle spasms, and hair loss.  The patient verbalized understanding of the proper use and possible adverse effects of Odomzo.  All of the patient's questions and concerns were addressed. Tremfya Counseling: I discussed with the patient the risks of guselkumab including but not limited to immunosuppression, serious infections, and drug reactions.  The patient understands that monitoring is required including a PPD at baseline and must alert us or the primary physician if symptoms of infection or other concerning signs are noted. Methotrexate Pregnancy And Lactation Text: This medication is Pregnancy Category X and is known to cause fetal harm. This medication is excreted in breast milk. Drysol Counseling:  I discussed with the patient the risks of drysol/aluminum chloride including but not limited to skin rash, itching, irritation, burning. Cephalexin Counseling: I counseled the patient regarding use of cephalexin as an antibiotic for prophylactic and/or therapeutic purposes. Cephalexin (commonly prescribed under brand name Keflex) is a cephalosporin antibiotic which is active against numerous classes of bacteria, including most skin bacteria. Side effects may include nausea, diarrhea, gastrointestinal upset, rash, hives, yeast infections, and in rare cases, hepatitis, kidney disease, seizures, fever, confusion, neurologic symptoms, and others. Patients with severe allergies to penicillin medications are cautioned that there is about a 10% incidence of cross-reactivity with cephalosporins. When possible, patients with penicillin allergies should use alternatives to cephalosporins for antibiotic therapy. Humira Counseling:  I discussed with the patient the risks of adalimumab including but not limited to myelosuppression, immunosuppression, autoimmune hepatitis, demyelinating diseases, lymphoma, and serious infections.  The patient understands that monitoring is required including a PPD at baseline and must alert us or the primary physician if symptoms of infection or other concerning signs are noted. Propranolol Pregnancy And Lactation Text: This medication is Pregnancy Category C and it isn't known if it is safe during pregnancy. It is excreted in breast milk. Tazorac Counseling:  Patient advised that medication is irritating and drying.  Patient may need to apply sparingly and wash off after an hour before eventually leaving it on overnight.  The patient verbalized understanding of the proper use and possible adverse effects of tazorac.  All of the patient's questions and concerns were addressed. Azathioprine Counseling:  I discussed with the patient the risks of azathioprine including but not limited to myelosuppression, immunosuppression, hepatotoxicity, lymphoma, and infections.  The patient understands that monitoring is required including baseline LFTs, Creatinine, possible TPMP genotyping and weekly CBCs for the first month and then every 2 weeks thereafter.  The patient verbalized understanding of the proper use and possible adverse effects of azathioprine.  All of the patient's questions and concerns were addressed. SSKI Counseling:  I discussed with the patient the risks of SSKI including but not limited to thyroid abnormalities, metallic taste, GI upset, fever, headache, acne, arthralgias, paraesthesias, lymphadenopathy, easy bleeding, arrhythmias, and allergic reaction. Cimzia Counseling:  I discussed with the patient the risks of Cimzia including but not limited to immunosuppression, allergic reactions and infections.  The patient understands that monitoring is required including a PPD at baseline and must alert us or the primary physician if symptoms of infection or other concerning signs are noted. Ketoconazole Counseling:   Patient counseled regarding improving absorption with orange juice.  Adverse effects include but are not limited to breast enlargement, headache, diarrhea, nausea, upset stomach, liver function test abnormalities, taste disturbance, and stomach pain.  There is a rare possibility of liver failure that can occur when taking ketoconazole. The patient understands that monitoring of LFTs may be required, especially at baseline. The patient verbalized understanding of the proper use and possible adverse effects of ketoconazole.  All of the patient's questions and concerns were addressed. Hydroxyzine Counseling: Patient advised that the medication is sedating and not to drive a car after taking this medication.  Patient informed of potential adverse effects including but not limited to dry mouth, urinary retention, and blurry vision.  The patient verbalized understanding of the proper use and possible adverse effects of hydroxyzine.  All of the patient's questions and concerns were addressed. Adbry Counseling: I discussed with the patient the risks of tralokinumab including but not limited to eye infection and irritation, cold sores, injection site reactions, worsening of asthma, allergic reactions and increased risk of parasitic infection.  Live vaccines should be avoided while taking tralokinumab. The patient understands that monitoring is required and they must alert us or the primary physician if symptoms of infection or other concerning signs are noted. Benzoyl Peroxide Counseling: Patient counseled that medicine may cause skin irritation and bleach clothing.  In the event of skin irritation, the patient was advised to reduce the amount of the drug applied or use it less frequently.   The patient verbalized understanding of the proper use and possible adverse effects of benzoyl peroxide.  All of the patient's questions and concerns were addressed. Tazorac Pregnancy And Lactation Text: This medication is not safe during pregnancy. It is unknown if this medication is excreted in breast milk. Olanzapine Pregnancy And Lactation Text: This medication is pregnancy category C.   There are no adequate and well controlled trials with olanzapine in pregnant females.  Olanzapine should be used during pregnancy only if the potential benefit justifies the potential risk to the fetus.   In a study in lactating healthy women, olanzapine was excreted in breast milk.  It is recommended that women taking olanzapine should not breast feed. Metronidazole Pregnancy And Lactation Text: This medication is Pregnancy Category B and considered safe during pregnancy.  It is also excreted in breast milk. Protopic Counseling: Patient may experience a mild burning sensation during topical application. Protopic is not approved in children less than 2 years of age. There have been case reports of hematologic and skin malignancies in patients using topical calcineurin inhibitors although causality is questionable.

## 2023-07-31 ENCOUNTER — APPOINTMENT (OUTPATIENT)
Dept: INTERNAL MEDICINE | Facility: CLINIC | Age: 38
End: 2023-07-31
Payer: COMMERCIAL

## 2023-07-31 VITALS
SYSTOLIC BLOOD PRESSURE: 106 MMHG | DIASTOLIC BLOOD PRESSURE: 69 MMHG | HEIGHT: 66 IN | TEMPERATURE: 97.8 F | WEIGHT: 134 LBS | RESPIRATION RATE: 17 BRPM | OXYGEN SATURATION: 100 % | BODY MASS INDEX: 21.53 KG/M2 | HEART RATE: 60 BPM

## 2023-07-31 DIAGNOSIS — Z00.00 ENCOUNTER FOR GENERAL ADULT MEDICAL EXAMINATION W/OUT ABNORMAL FINDINGS: ICD-10-CM

## 2023-07-31 PROCEDURE — 99395 PREV VISIT EST AGE 18-39: CPT

## 2023-09-20 NOTE — ASU PATIENT PROFILE, ADULT - BLOOD TRANSFUSION, PREVIOUS, PROFILE
Pharmacy aware and it still cost $180/mo  They will contact patient to see if she wants to proceed with medication 1/2018--- after a vaginal delivery - 2 units/yes

## 2023-10-06 LAB
ALBUMIN SERPL ELPH-MCNC: 4.7 G/DL
ALP BLD-CCNC: 73 U/L
ALT SERPL-CCNC: 13 U/L
ANION GAP SERPL CALC-SCNC: 13 MMOL/L
AST SERPL-CCNC: 18 U/L
BILIRUB SERPL-MCNC: 0.6 MG/DL
BUN SERPL-MCNC: 17 MG/DL
CALCIUM SERPL-MCNC: 9.6 MG/DL
CHLORIDE SERPL-SCNC: 105 MMOL/L
CHOLEST SERPL-MCNC: 190 MG/DL
CO2 SERPL-SCNC: 23 MMOL/L
CREAT SERPL-MCNC: 0.97 MG/DL
EGFR: 77 ML/MIN/1.73M2
GLUCOSE SERPL-MCNC: 81 MG/DL
HCT VFR BLD CALC: 39.4 %
HDLC SERPL-MCNC: 66 MG/DL
HGB BLD-MCNC: 12.8 G/DL
LDLC SERPL CALC-MCNC: 113 MG/DL
MCHC RBC-ENTMCNC: 30.1 PG
MCHC RBC-ENTMCNC: 32.5 GM/DL
MCV RBC AUTO: 92.7 FL
NONHDLC SERPL-MCNC: 124 MG/DL
PLATELET # BLD AUTO: 348 K/UL
POTASSIUM SERPL-SCNC: 4.3 MMOL/L
PROT SERPL-MCNC: 6.7 G/DL
RBC # BLD: 4.25 M/UL
RBC # FLD: 12.4 %
SODIUM SERPL-SCNC: 140 MMOL/L
TRIGL SERPL-MCNC: 58 MG/DL
TSH SERPL-ACNC: 3.58 UIU/ML
WBC # FLD AUTO: 7.66 K/UL

## 2024-01-13 ENCOUNTER — APPOINTMENT (OUTPATIENT)
Dept: ULTRASOUND IMAGING | Facility: IMAGING CENTER | Age: 39
End: 2024-01-13
Payer: COMMERCIAL

## 2024-01-13 ENCOUNTER — APPOINTMENT (OUTPATIENT)
Dept: MAMMOGRAPHY | Facility: IMAGING CENTER | Age: 39
End: 2024-01-13
Payer: COMMERCIAL

## 2024-01-13 ENCOUNTER — OUTPATIENT (OUTPATIENT)
Dept: OUTPATIENT SERVICES | Facility: HOSPITAL | Age: 39
LOS: 1 days | End: 2024-01-13
Payer: COMMERCIAL

## 2024-01-13 DIAGNOSIS — Z98.890 OTHER SPECIFIED POSTPROCEDURAL STATES: Chronic | ICD-10-CM

## 2024-01-13 DIAGNOSIS — Z00.8 ENCOUNTER FOR OTHER GENERAL EXAMINATION: ICD-10-CM

## 2024-01-13 PROCEDURE — 77067 SCR MAMMO BI INCL CAD: CPT | Mod: 26

## 2024-01-13 PROCEDURE — 77067 SCR MAMMO BI INCL CAD: CPT

## 2024-01-13 PROCEDURE — 77063 BREAST TOMOSYNTHESIS BI: CPT | Mod: 26

## 2024-01-13 PROCEDURE — 76641 ULTRASOUND BREAST COMPLETE: CPT | Mod: 26,50

## 2024-01-13 PROCEDURE — 76641 ULTRASOUND BREAST COMPLETE: CPT

## 2024-01-13 PROCEDURE — 77063 BREAST TOMOSYNTHESIS BI: CPT

## 2024-03-13 NOTE — ASU PATIENT PROFILE, ADULT - TEACHING/LEARNING DEVELOPMENTAL CONSIDERATIONS
Mirvaso Pregnancy And Lactation Text: This medication has not been assigned a Pregnancy Risk Category. It is unknown if the medication is excreted in breast milk. none

## 2024-10-11 ENCOUNTER — APPOINTMENT (OUTPATIENT)
Dept: INTERNAL MEDICINE | Facility: CLINIC | Age: 39
End: 2024-10-11
Payer: COMMERCIAL

## 2024-10-11 VITALS
HEART RATE: 72 BPM | HEIGHT: 66 IN | DIASTOLIC BLOOD PRESSURE: 78 MMHG | BODY MASS INDEX: 21.69 KG/M2 | OXYGEN SATURATION: 100 % | RESPIRATION RATE: 17 BRPM | TEMPERATURE: 97.8 F | SYSTOLIC BLOOD PRESSURE: 114 MMHG | WEIGHT: 135 LBS

## 2024-10-11 DIAGNOSIS — Z00.00 ENCOUNTER FOR GENERAL ADULT MEDICAL EXAMINATION W/OUT ABNORMAL FINDINGS: ICD-10-CM

## 2024-10-11 DIAGNOSIS — L81.9 DISORDER OF PIGMENTATION, UNSPECIFIED: ICD-10-CM

## 2024-10-11 DIAGNOSIS — E78.5 HYPERLIPIDEMIA, UNSPECIFIED: ICD-10-CM

## 2024-10-11 DIAGNOSIS — N39.3 STRESS INCONTINENCE (FEMALE) (MALE): ICD-10-CM

## 2024-10-11 DIAGNOSIS — L98.8 OTHER SPECIFIED DISORDERS OF THE SKIN AND SUBCUTANEOUS TISSUE: ICD-10-CM

## 2024-10-11 DIAGNOSIS — Z87.2 PERSONAL HISTORY OF DISEASES OF THE SKIN AND SUBCUTANEOUS TISSUE: ICD-10-CM

## 2024-10-11 DIAGNOSIS — Z98.890 OTHER SPECIFIED POSTPROCEDURAL STATES: ICD-10-CM

## 2024-10-11 DIAGNOSIS — T14.8XXA OTHER INJURY OF UNSPECIFIED BODY REGION, INITIAL ENCOUNTER: ICD-10-CM

## 2024-10-11 DIAGNOSIS — N36.41 HYPERMOBILITY OF URETHRA: ICD-10-CM

## 2024-10-11 DIAGNOSIS — Z01.818 ENCOUNTER FOR OTHER PREPROCEDURAL EXAMINATION: ICD-10-CM

## 2024-10-11 DIAGNOSIS — Z23 ENCOUNTER FOR IMMUNIZATION: ICD-10-CM

## 2024-10-11 DIAGNOSIS — N81.6 RECTOCELE: ICD-10-CM

## 2024-10-11 DIAGNOSIS — R43.9 UNSPECIFIED DISTURBANCES OF SMELL AND TASTE: ICD-10-CM

## 2024-10-11 DIAGNOSIS — L30.1 DYSHIDROSIS [POMPHOLYX]: ICD-10-CM

## 2024-10-11 PROCEDURE — G0008: CPT

## 2024-10-11 PROCEDURE — 99395 PREV VISIT EST AGE 18-39: CPT

## 2024-10-11 PROCEDURE — 90656 IIV3 VACC NO PRSV 0.5 ML IM: CPT

## 2024-10-12 LAB
ALBUMIN SERPL ELPH-MCNC: 4.9 G/DL
ALP BLD-CCNC: 62 U/L
ALT SERPL-CCNC: 9 U/L
ANION GAP SERPL CALC-SCNC: 17 MMOL/L
AST SERPL-CCNC: 15 U/L
BILIRUB SERPL-MCNC: 0.6 MG/DL
BUN SERPL-MCNC: 14 MG/DL
CALCIUM SERPL-MCNC: 9.8 MG/DL
CHLORIDE SERPL-SCNC: 103 MMOL/L
CHOLEST SERPL-MCNC: 226 MG/DL
CO2 SERPL-SCNC: 23 MMOL/L
CREAT SERPL-MCNC: 0.78 MG/DL
EGFR: 99 ML/MIN/1.73M2
GLUCOSE SERPL-MCNC: 70 MG/DL
HCT VFR BLD CALC: 41.8 %
HDLC SERPL-MCNC: 72 MG/DL
HGB BLD-MCNC: 13.1 G/DL
LDLC SERPL CALC-MCNC: 144 MG/DL
MCHC RBC-ENTMCNC: 30 PG
MCHC RBC-ENTMCNC: 31.3 GM/DL
MCV RBC AUTO: 95.7 FL
NONHDLC SERPL-MCNC: 154 MG/DL
PLATELET # BLD AUTO: 317 K/UL
POTASSIUM SERPL-SCNC: 5.1 MMOL/L
PROT SERPL-MCNC: 7.1 G/DL
RBC # BLD: 4.37 M/UL
RBC # FLD: 13.1 %
SODIUM SERPL-SCNC: 142 MMOL/L
TRIGL SERPL-MCNC: 60 MG/DL
TSH SERPL-ACNC: 2.23 UIU/ML
WBC # FLD AUTO: 7.28 K/UL

## 2024-11-06 NOTE — DISCUSSION/SUMMARY
[FreeTextEntry1] : \luis Valverde is a 38 yo s/p posterior repair, retropubic midurethral sling, cystoscopy on 4/20/2023. Doing well since surgery, without issues. RTO in 3-4 weeks or sooner if issues arise. All questions answered. Pt instructed to call office if any further questions/concerns arise. 
1 pair

## 2024-12-02 NOTE — ASU PATIENT PROFILE, ADULT - TEACHING/LEARNING OTHER LEARNERS
Medication:   Requested Prescriptions     Pending Prescriptions Disp Refills    amLODIPine (NORVASC) 5 MG tablet [Pharmacy Med Name: AMLODIPINE BESYLATE 5 MG TAB] 90 tablet 3     Sig: TAKE 1 TABLET BY MOUTH EVERY DAY        Last Filled:      Patient Phone Number: [phone] (home)     Last appt: 10/24/2024   Next appt: 1/23/2025    Last OARRS:        No data to display                   spouse

## 2025-01-23 ENCOUNTER — APPOINTMENT (OUTPATIENT)
Dept: OBGYN | Facility: CLINIC | Age: 40
End: 2025-01-23
Payer: COMMERCIAL

## 2025-01-23 PROCEDURE — 99385 PREV VISIT NEW AGE 18-39: CPT

## 2025-01-23 PROCEDURE — 99459 PELVIC EXAMINATION: CPT

## 2025-01-23 PROCEDURE — 96127 BRIEF EMOTIONAL/BEHAV ASSMT: CPT

## 2025-03-14 ENCOUNTER — APPOINTMENT (OUTPATIENT)
Dept: ULTRASOUND IMAGING | Facility: CLINIC | Age: 40
End: 2025-03-14
Payer: COMMERCIAL

## 2025-03-14 ENCOUNTER — APPOINTMENT (OUTPATIENT)
Dept: MAMMOGRAPHY | Facility: CLINIC | Age: 40
End: 2025-03-14
Payer: COMMERCIAL

## 2025-03-14 ENCOUNTER — OUTPATIENT (OUTPATIENT)
Dept: OUTPATIENT SERVICES | Facility: HOSPITAL | Age: 40
LOS: 1 days | End: 2025-03-14
Payer: COMMERCIAL

## 2025-03-14 DIAGNOSIS — Z98.890 OTHER SPECIFIED POSTPROCEDURAL STATES: Chronic | ICD-10-CM

## 2025-03-14 DIAGNOSIS — Z00.8 ENCOUNTER FOR OTHER GENERAL EXAMINATION: ICD-10-CM

## 2025-03-14 PROCEDURE — 76641 ULTRASOUND BREAST COMPLETE: CPT | Mod: 26,50

## 2025-03-14 PROCEDURE — 77067 SCR MAMMO BI INCL CAD: CPT | Mod: 26

## 2025-03-14 PROCEDURE — 77063 BREAST TOMOSYNTHESIS BI: CPT | Mod: 26

## 2025-03-14 PROCEDURE — 77067 SCR MAMMO BI INCL CAD: CPT

## 2025-03-14 PROCEDURE — 77063 BREAST TOMOSYNTHESIS BI: CPT

## 2025-03-14 PROCEDURE — 76641 ULTRASOUND BREAST COMPLETE: CPT

## 2025-03-21 ENCOUNTER — APPOINTMENT (OUTPATIENT)
Dept: MAMMOGRAPHY | Facility: CLINIC | Age: 40
End: 2025-03-21
Payer: COMMERCIAL

## 2025-03-21 ENCOUNTER — OUTPATIENT (OUTPATIENT)
Dept: OUTPATIENT SERVICES | Facility: HOSPITAL | Age: 40
LOS: 1 days | End: 2025-03-21
Payer: COMMERCIAL

## 2025-03-21 ENCOUNTER — APPOINTMENT (OUTPATIENT)
Dept: ULTRASOUND IMAGING | Facility: CLINIC | Age: 40
End: 2025-03-21
Payer: COMMERCIAL

## 2025-03-21 DIAGNOSIS — Z98.890 OTHER SPECIFIED POSTPROCEDURAL STATES: Chronic | ICD-10-CM

## 2025-03-21 DIAGNOSIS — Z00.8 ENCOUNTER FOR OTHER GENERAL EXAMINATION: ICD-10-CM

## 2025-03-21 PROCEDURE — 77065 DX MAMMO INCL CAD UNI: CPT

## 2025-03-21 PROCEDURE — 77065 DX MAMMO INCL CAD UNI: CPT | Mod: 26,RT

## 2025-03-21 PROCEDURE — G0279: CPT | Mod: 26

## 2025-03-21 PROCEDURE — 76642 ULTRASOUND BREAST LIMITED: CPT | Mod: 26,RT

## 2025-03-21 PROCEDURE — G0279: CPT

## 2025-03-21 PROCEDURE — 76642 ULTRASOUND BREAST LIMITED: CPT

## 2025-06-13 NOTE — H&P PST ADULT - ALCOHOL USE HISTORY SINGLE SELECT
FOLLOW UP with Dr. Armas in 6 months with a BP check. We will call you to schedule this appointment about 2-3 months prior.     CONTINUE losartan-hydrochlorothiazide 50-12.5 mg twice a day  You are safe to take creatine supplement    If you need to contact Dr. Armas:  Call  option 1 and ask for Abbi (Admin), Brenda RN, Jessica RN, Lori RN or Cindy RN  Message Dr Armas and the Adult Congenital Cardiology team in the [employer] [facility]     Calls and messages are triaged based on urgency Monday-Friday 8:00am-4:30pm.   If you call outside of business hours or on weekends, please ask for the On-Call [facility] Provider    Fax: [phone] Attn: Dr Armas / HARRIET    For any requests for paperwork to be completed by our office, please allow 7-10 business days for completion.  For all medical records requests: [email or web address]  Clinic notes can be printed from the [employer] portal: [email or web address]    
yes...

## (undated) DEVICE — PACK MINOR

## (undated) DEVICE — GLV 6.5 PROTEXIS (WHITE)

## (undated) DEVICE — FOLEY CATH 2-WAY 16FR 5CC SILICONE

## (undated) DEVICE — SUT MAXON 2-0 27" T-5

## (undated) DEVICE — MEDICATION LABELS W MARKER

## (undated) DEVICE — SYR LUER LOK 20CC

## (undated) DEVICE — PREP BETADINE SPONGE STICKS

## (undated) DEVICE — SPECIMEN CONTAINER 100ML

## (undated) DEVICE — DRAPE LAVH 124" X 30" X125"

## (undated) DEVICE — TUBING TUR 2 PRONG

## (undated) DEVICE — DRAPE MAYO STAND 30"

## (undated) DEVICE — DRSG KLING 4"

## (undated) DEVICE — WARMING BLANKET UPPER ADULT

## (undated) DEVICE — SUT MAXON 2-0 30" GS-22

## (undated) DEVICE — POSITIONER FOAM EGG CRATE ULNAR 2PCS (PINK)

## (undated) DEVICE — FOLEY TRAY 16FR 5CC LTX UMETER CLOSED

## (undated) DEVICE — SOL IRR POUR H2O 100ML

## (undated) DEVICE — GLV 7.5 PROTEXIS (WHITE)

## (undated) DEVICE — SOL IRR POUR H2O 250ML

## (undated) DEVICE — DRAPE TOWEL BLUE 17" X 24"

## (undated) DEVICE — VENODYNE/SCD SLEEVE CALF LARGE

## (undated) DEVICE — SUT VICRYL 2-0 27" CT-2

## (undated) DEVICE — SUT POLYSORB 0 30" GS-21 UNDYED

## (undated) DEVICE — DRAPE INSTRUMENT POUCH 6.75" X 11"

## (undated) DEVICE — SOL IRR POUR NS 0.9% 500ML